# Patient Record
Sex: FEMALE | Race: BLACK OR AFRICAN AMERICAN | NOT HISPANIC OR LATINO | Employment: OTHER | URBAN - METROPOLITAN AREA
[De-identification: names, ages, dates, MRNs, and addresses within clinical notes are randomized per-mention and may not be internally consistent; named-entity substitution may affect disease eponyms.]

---

## 2017-01-23 ENCOUNTER — OFFICE VISIT (OUTPATIENT)
Dept: PULMONOLOGY | Facility: CLINIC | Age: 62
End: 2017-01-23

## 2017-01-23 VITALS
RESPIRATION RATE: 16 BRPM | OXYGEN SATURATION: 98 % | DIASTOLIC BLOOD PRESSURE: 90 MMHG | TEMPERATURE: 97.4 F | HEIGHT: 63 IN | SYSTOLIC BLOOD PRESSURE: 140 MMHG | WEIGHT: 194 LBS | HEART RATE: 86 BPM | BODY MASS INDEX: 34.38 KG/M2

## 2017-01-23 DIAGNOSIS — D86.9 SARCOIDOSIS: ICD-10-CM

## 2017-01-23 DIAGNOSIS — J30.89 PERENNIAL ALLERGIC RHINITIS, UNSPECIFIED ALLERGIC RHINITIS TRIGGER: ICD-10-CM

## 2017-01-23 DIAGNOSIS — J45.20 MILD INTERMITTENT ASTHMA WITHOUT COMPLICATION: Primary | ICD-10-CM

## 2017-01-23 PROCEDURE — 99214 OFFICE O/P EST MOD 30 MIN: CPT | Performed by: NURSE PRACTITIONER

## 2017-01-23 NOTE — MR AVS SNAPSHOT
Kathy Asher   1/23/2017 12:30 PM   Office Visit    Dept Phone:  [phone]   Encounter #:  [id number]    Provider:  SYLVESTER Noel   Department:  [facility]                Your Full Care Plan              Today's Medication Changes          These changes are accurate as of: 1/23/17  1:43 PM.  If you have any questions, ask your nurse or doctor.               Stop taking medication(s)listed here:     Okmopxyfx-Eymmjtoe-RS 30-3-15 MG/5ML syrup                      Your Updated Medication List          This list is accurate as of: 1/23/17  1:43 PM.  Always use your most recent med list.                ADVANCED CALCIUM/D/MAGNESIUM tablet       albuterol 108 (90 BASE) MCG/ACT inhaler   Commonly known as:  PROAIR HFA   Inhale 2 puffs every 4 (four) hours as needed for wheezing.       ASTEPRO 0.15 % solution nasal spray   Generic drug:  azelastine       cetirizine 10 MG tablet   Commonly known as:  zyrTEC       FLONASE 50 MCG/ACT nasal spray   Generic drug:  fluticasone       latanoprost 0.005 % ophthalmic solution   Commonly known as:  XALATAN       MULTI-VITAMIN tablet       Vitamin C 500 MG capsule       Vitamin E 400 UNITS tablet       ZYRTEC-D ALLERGY & CONGESTION 5-120 MG per 12 hr tablet   Generic drug:  cetirizine-pseudoephedrine               You Were Diagnosed With        Codes Comments    Mild intermittent asthma without complication    -  Primary ICD-10-CM: J45.20  ICD-9-CM: 493.90     Sarcoidosis     ICD-10-CM: D86.9  ICD-9-CM: 135     Perennial allergic rhinitis, unspecified allergic rhinitis trigger     ICD-10-CM: J30.89  ICD-9-CM: 477.8       Instructions     None    Patient Instructions History      Upcoming Appointments     Visit Type Date Time Department    FOLLOW UP 1/23/2017 12:30 PM [facility]    FOLLOW UP 1 UNIT 1/31/2017 11:00 AM [facility]    FOLLOW UP 3/23/2017 11:00 AM [facility]    FOLLOW UP 2  "UNIT 4/20/2017 10:30 AM [facility]      MyChart Signup     Our records indicate that you have declined [facility] MyChart signup. If you would like to sign up for MyChart, please email [email or web address] or call [phone] to obtain an activation code.             Other Info from Your Visit           Your Appointments     Jan 31, 2017 11:00 AM EST   FOLLOW UP with SYLVESTER Avila   [facility] GYN[facility]OLOG[facility] ONCOL[facility] (--)    [address] [place] [id number] [phone]            Mar 23, 2017 11:00 AM EDT   Follow Up with SYLVESTER Noel   [facility] (--)    [address]  [facility] [address] [phone]           Arrive 15 minutes prior to appointment.            Apr 20, 2017 10:30 AM EDT   FOLLOW UP with SYLVESTER Avila   [facility] GYNECOLOGIC ONCOLOGY (--)    [address] [address] [phone]              Allergies     Ciprofloxacin      Etodolac        Reason for Visit     Breathing Problem           Vital Signs     Blood Pressure Pulse Temperature Respirations Height Weight    140/90 86 97.4 °F (36.3 °C) 16 63\" (160 cm) 194 lb (88 kg)    Oxygen Saturation Body Mass Index Smoking Status             98% 34.37 kg/m2 Never Smoker         Problems and Diagnoses Noted     Nasal inflammation due to allergen    Asthma    Sarcoidosis        "

## 2017-01-23 NOTE — PROGRESS NOTES
"[facility] Pulmonary Follow up    CHIEF COMPLAINT    Asthma, chronic cough    HISTORY OF PRESENT ILLNESS    Kathy Asher is a 61 y.o.female here today for routine follow-up.  She is seen for chronic cough with asthma and allergic rhinitis.  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    She's had a couple episodes of bronchitis over the fall currently she is doing much better.  She has very little cough or sputum production.  She has no shortness of breath.  She denies any fevers or chills.  She's been using her Flonase as well as her Zyrtec-D and is having very adequate control of her allergic rhinitis.    She occasionally uses her albuterol for some wheezing but not daily.  Her last PFTs in August were normal with an FEV1 of 112%, total lung capacity of 90.    Unfortunately she's having some issues with some air quality trouble in her home, she has a \"drug house\" next to her that's causing some problems.  She is seeking advice of the health department for air quality, as well as the Police Department in her neighborhood association.  She does have some worsening irritation and cough for she's in her home.    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)   • Abnormal finding on lung imaging   • Asthma   • IBS (irritable bowel syndrome)   • Murmur   • Raised level of immunoglobulins   • Sarcoidosis   • Allergic rhinitis       Allergies   Allergen Reactions   • Ciprofloxacin    • Etodolac        Current Outpatient Prescriptions:   •  albuterol (PROAIR HFA) 108 (90 BASE) MCG/ACT inhaler, Inhale 2 puffs every 4 (four) hours as needed for wheezing., Disp: 1 inhaler, Rfl: 11  •  Ascorbic Acid (VITAMIN C) 500 MG capsule, Take  by mouth., Disp: , Rfl:   •  azelastine (ASTEPRO) 0.15 % solution " "nasal spray, into each nostril., Disp: , Rfl:   •  cetirizine (ZyrTEC) 10 MG tablet, Take  by mouth., Disp: , Rfl:   •  cetirizine-pseudoephedrine (ZYRTEC-D ALLERGY & CONGESTION) 5-120 MG per 12 hr tablet, Take  by mouth., Disp: , Rfl:   •  fluticasone (FLONASE) 50 MCG/ACT nasal spray, into each nostril 2 (two) times a day., Disp: , Rfl:   •  latanoprost (XALATAN) 0.005 % ophthalmic solution, Apply  to eye., Disp: , Rfl:   •  Multiple Minerals-Vitamins (ADVANCED CALCIUM/D/MAGNESIUM) tablet, Take  by mouth., Disp: , Rfl:   •  Multiple Vitamin (MULTI-VITAMIN) tablet, Take  by mouth., Disp: , Rfl:   •  Vitamin E 400 UNITS tablet, Take  by mouth., Disp: , Rfl:   MEDICATION LIST AND ALLERGIES REVIEWED.    Social History   Substance Use Topics   • Smoking status: Never Smoker   • Smokeless tobacco: Not on file   • Alcohol use No       FAMILY AND SOCIAL HISTORY REVIEWED.    Review of Systems   Constitutional: Negative for chills, fatigue, fever and unexpected weight change.   HENT: Positive for postnasal drip. Negative for congestion, nosebleeds, rhinorrhea, sinus pressure and trouble swallowing.    Respiratory: Positive for cough. Negative for chest tightness, shortness of breath and wheezing.    Cardiovascular: Negative for chest pain and leg swelling.   Gastrointestinal: Negative for abdominal pain, constipation, diarrhea, nausea and vomiting.   Genitourinary: Negative for dysuria, frequency, hematuria and urgency.   Musculoskeletal: Negative for myalgias.   Neurological: Negative for dizziness, weakness, numbness and headaches.   All other systems reviewed and are negative.  .    Visit Vitals   • /90   • Pulse 86   • Temp 97.4 °F (36.3 °C)   • Resp 16   • Ht 63\" (160 cm)   • Wt 194 lb (88 kg)   • SpO2 98%  Comment: RA   • BMI 34.37 kg/m2     Physical Exam   Constitutional: She is oriented to person, place, and time. She appears well-developed and well-nourished.   HENT:   Head: Normocephalic and atraumatic. "   Eyes: EOM are normal. Pupils are equal, round, and reactive to light.   Neck: Normal range of motion. Neck supple.   Cardiovascular: Normal rate and regular rhythm.    No murmur heard.  Pulmonary/Chest: Effort normal and breath sounds normal. No respiratory distress. She has no wheezes. She has no rales.   Abdominal: Soft. Bowel sounds are normal. She exhibits no distension.   Musculoskeletal: Normal range of motion. She exhibits no edema.   Neurological: She is alert and oriented to person, place, and time.   Skin: Skin is warm and dry. No erythema.   Psychiatric: She has a normal mood and affect. Her behavior is normal.   Vitals reviewed.      RESULTS        PROBLEM LIST    Problem List Items Addressed This Visit        Respiratory    Asthma - Primary    Allergic rhinitis       Other    Sarcoidosis    Overview     Diagnosed by lymph node biopsy in 2000.  She has well preserved spirometry.                     DISCUSSION    We discussed the possibility of adding a better filter on her home to see if this would help with the air circulation.  Continue with her current regimen    Follow-up in 6 months or as needed.    Maggi Valdes, SYLVESTER  01/23/201712:44 PM  Electronically signed     Please note that portions of this note were completed with a voice recognition program. Efforts were made to edit the dictations, but occasionally words are mistranscribed.      CC: Gilberto York MD

## 2017-01-31 ENCOUNTER — OFFICE VISIT (OUTPATIENT)
Dept: GYNECOLOGIC ONCOLOGY | Facility: CLINIC | Age: 62
End: 2017-01-31

## 2017-01-31 VITALS
WEIGHT: 192 LBS | DIASTOLIC BLOOD PRESSURE: 79 MMHG | OXYGEN SATURATION: 99 % | TEMPERATURE: 96.8 F | BODY MASS INDEX: 34.01 KG/M2 | RESPIRATION RATE: 16 BRPM | SYSTOLIC BLOOD PRESSURE: 143 MMHG | HEART RATE: 90 BPM

## 2017-01-31 DIAGNOSIS — T21.45XD: ICD-10-CM

## 2017-01-31 DIAGNOSIS — D07.2 VAIN III (VAGINAL INTRAEPITHELIAL NEOPLASIA GRADE III): Primary | ICD-10-CM

## 2017-01-31 PROCEDURE — 99213 OFFICE O/P EST LOW 20 MIN: CPT | Performed by: NURSE PRACTITIONER

## 2017-01-31 NOTE — MR AVS SNAPSHOT
Kathy Asher   1/31/2017 11:00 AM   Office Visit    Dept Phone:  [phone]   Encounter #:  [id number]    Provider:  SYLVESTER Avila   Department:  [facility]                Your Full Care Plan              Your Updated Medication List          This list is accurate as of: 1/31/17 11:54 AM.  Always use your most recent med list.                ADVANCED CALCIUM/D/MAGNESIUM tablet       albuterol 108 (90 BASE) MCG/ACT inhaler   Commonly known as:  PROAIR HFA   Inhale 2 puffs every 4 (four) hours as needed for wheezing.       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 If you would like to sign up for [employer], please email [email or web address] or call [phone] to obtain an activation code.             Other Info from Your Visit           Your Appointments     Mar  23, 2017 11:00 AM EDT   Follow Up with SYLVESTER Noel   [facility] (--)    [address] [facility]  [facility] [address] [phone]           Arrive 15 minutes prior to appointment.            Jul 27, 2017  2:30 PM EDT   FOLLOW UP with SYLVESTER Avila   [facility] GYNECOLOGIC ONCOLOGY (--)    [address] [facility] [address]  [facility] [address] [phone]              Allergies     Ciprofloxacin      Etodolac        Reason for Visit     Follow-up PRESSURE AND LQ PAIN.       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

## 2017-01-31 NOTE — PROGRESS NOTES
GYN ONCOLOGY FOLLOW-UP    Kathy Asher  [id number]  1955    Chief Complaint: Follow-up (PRESSURE AND LQ PAIN. )        History of present illness:  Kathy Asher is a 61 y.o. year old female who is here today for ongoing surveillance of vaginal dysplasia with repeat pap smear. She is s/p upper vaginectomy on 6/27/2016. Her post-op course was complicated by a gluteal fold lesion requiring wound care thought to be related to reaction to surgical prep. Today she reports she is feeling generally well except for some occasional pelvic pressure that is intermittent. She also reports some concern regarding the scarring to her buttock. The area has healed well, but she has a large dark scar that has remained despite applying OTC scar treatment lotions. She also has some occasional nerve pain that starts in the buttock and radiates outward on occasion. She informs me she has spoken with the financial team at [facility] during her wound care phase. She does report she is somewhat frustrated, however, as she tried to contact the Risk  she had been working with in 8/2016 and never received a return phone call.   She denies vaginal bleeding, pelvic pain, visible genital lesions, or changes in bowel or bladder function.       Past Medical History   Diagnosis Date   • Abnormal finding on lung imaging      CT scan of the chest 06/04/2014 reports interval improvement in the right perihilar masslike opacity and stability in the left perihilar opacity, pulmonary nodules and lymphadenopathy.   • Abnormal Pap smear of cervix    • Allergic rhinitis    • Asthma    • Cervical dysplasia      CIN2   • History of chest x-ray 08/27/2015     interval increase in patchy opacity within the right upper lobe compared to prior study; previously described left mid lung opacity appears similar to the prior study; dextrocurvature of thoracic spine with mild degenerative changes of the spine    • History of chest x-ray  07/09/2012     extensive, masslike and infiltrating disease of the right upper lobe and mild left perihilar disease   • History of chest x-ray 11/17/2010     chest is hyperinflated; infiltrate in right upper lobe apical segment with some associated adenopathy and old granulomatous changes    • History of histoplasmosis    • History of PFTs 06/06/2014     data is acceptable and reproducible; pt given 3 puffs of albuterol; pt gave good effort    • History of PFTs 08/12/2013     data is acceptable and reproducible; pt given 3 puffs of albuterol; pt gave good effort    • History of PFTs 01/24/2011     pt gave good effort; spirometry is acceptable and reproducible;    • IBS (irritable bowel syndrome)    • Sarcoidosis      Diagnosed by lymph node biopsy in 2000.  She has well preserved spirometry.   • Severe vaginal dysplasia 05/02/2016     VAIN3   • Vulvar dysplasia      VIN3       Past Surgical History   Procedure Laterality Date   • Breast biopsy     • Lymph node biopsy     • Colonoscopy     • Diagnostic laparoscopy exploratory laparotomy     • Mouth surgery     • Excision lesion  06/27/2016     vaginal WLE       MEDICATIONS: The current medication list was reviewed and reconciled.     Allergies:  is allergic to ciprofloxacin and etodolac.    Family History   Problem Relation Age of Onset   • Hyperlipidemia Mother    • Hypertension Mother    • Arthritis Mother    • Heart disease Father    • Diabetes Sister    • Hyperlipidemia Sister    • Diabetes Maternal Grandmother      Review of Systems   Constitutional: Negative for appetite change, chills, fatigue, fever and unexpected weight change.   Respiratory: Negative for cough, shortness of breath and wheezing.    Cardiovascular: Negative for chest pain, palpitations and leg swelling.   Gastrointestinal: Negative for abdominal distention, abdominal pain, blood in stool, constipation, diarrhea, nausea and vomiting.   Endocrine: Negative.    Genitourinary: Positive for pelvic  "pain (\"pressure\"). Negative for dyspareunia, dysuria, frequency, genital sores, hematuria, urgency, vaginal bleeding, vaginal discharge and vaginal pain.   Musculoskeletal: Positive for back pain (\"shooting nerve pain\" near buttock lesion). Negative for arthralgias, gait problem and joint swelling.   Neurological: Negative for dizziness, seizures, syncope, weakness, light-headedness, numbness and headaches.   Hematological: Negative for adenopathy.   Psychiatric/Behavioral: Negative.        Physical Exam  Vital Signs: Visit Vitals   • /79   • Pulse 90   • Temp 96.8 °F (36 °C) (Temporal Artery )   • Resp 16   • Wt 192 lb (87.1 kg)   • SpO2 99%   • BMI 34.01 kg/m2      General Appearance:  alert, cooperative, no apparent distress and appears stated age   Neurologic/Psychiatric: A&O x 3, gait steady, appropriate affect   HEENT:  Normocephalic, without obvious abnormality, mucous membranes moist   Abdomen:   Soft, non-tender, non-distended and no organomegaly   Lymph nodes: No cervical, supraclavicular, inguinal or axillary adenopathy noted   Extremities: Normal, atraumatic; no clubbing, cyanosis, or edema    Pelvic: External Genitalia  without lesions or skin changes  Vagina  is pink, moist, without lesions.   Vaginal Cuff  Female Vaginal Cuff: smooth, intact, without visible lesions, pap obtained and well healed  Uterus  surgically absent  Ovaries  surgically absent bilaterallly  Parametria  smooth  Rectovaginal  Female rectovaginal: deferred       Buttock was examined and there is residual cutaneous pigmentation from previous gluteal fold lesion                                                               bilaterally. There is no skin disruption noted.        Procedure Notes:  No notes on file    Assessment and Plan:    Kathy was seen today for follow-up.    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 She may call in 1 week for today's results. She is in agreement with this plan.   I discussed with her I am pleased to see the buttock area has healed well, despite the darkened pigmentation. We discussed that wounds can have residual nerve effects for some time and this is likely the \"shooting\" sensations she occasionally feels. I allowed her to speak about her frustrations and informed her I will follow-up with Risk Management on her behalf. I will request that they follow-up with her. I encouraged her to call me at the clinic if she is not contacted within the next few weeks. She v/u.     Return in about 6 months (around 7/31/2017) for repeat pap smear.      Lina Butcher, SYLVESTER      Note: Speech recognition transcription software was used to dictate portions of this document.  An attempt at proofreading has been made though minor errors in transcription may still be present.  Please do not hesitate to call our office with any questions.  "

## 2017-06-29 ENCOUNTER — TRANSCRIBE ORDERS (OUTPATIENT)
Dept: FAMILY MEDICINE CLINIC | Facility: CLINIC | Age: 62
End: 2017-06-29

## 2017-06-29 ENCOUNTER — HOSPITAL ENCOUNTER (OUTPATIENT)
Dept: GENERAL RADIOLOGY | Facility: HOSPITAL | Age: 62
Discharge: HOME | End: 2017-06-29

## 2017-06-29 ENCOUNTER — LAB (OUTPATIENT)
Dept: LAB | Facility: HOSPITAL | Age: 62
End: 2017-06-29

## 2017-06-29 ENCOUNTER — TRANSCRIBE ORDERS (OUTPATIENT)
Dept: LAB | Facility: HOSPITAL | Age: 62
End: 2017-06-29

## 2017-06-29 ENCOUNTER — TRANSCRIBE ORDERS (OUTPATIENT)
Dept: ADMINISTRATIVE | Facility: HOSPITAL | Age: 62
End: 2017-06-29

## 2017-06-29 ENCOUNTER — HOSPITAL ENCOUNTER (OUTPATIENT)
Dept: GENERAL RADIOLOGY | Facility: HOSPITAL | Age: 62
Discharge: HOME | End: 2017-06-29
Admitting: NURSE PRACTITIONER

## 2017-06-29 DIAGNOSIS — M25.511 RIGHT SHOULDER PAIN, UNSPECIFIED CHRONICITY: Primary | ICD-10-CM

## 2017-06-29 DIAGNOSIS — I10 ESSENTIAL HYPERTENSION, MALIGNANT: ICD-10-CM

## 2017-06-29 DIAGNOSIS — R00.2 PALPITATIONS: ICD-10-CM

## 2017-06-29 DIAGNOSIS — R53.83 FATIGUE, UNSPECIFIED TYPE: ICD-10-CM

## 2017-06-29 DIAGNOSIS — I10 ESSENTIAL HYPERTENSION, MALIGNANT: Primary | ICD-10-CM

## 2017-06-29 DIAGNOSIS — R00.2 PALPITATIONS: Primary | ICD-10-CM

## 2017-06-29 DIAGNOSIS — M25.511 RIGHT SHOULDER PAIN, UNSPECIFIED CHRONICITY: ICD-10-CM

## 2017-06-29 DIAGNOSIS — Z51.81 ENCOUNTER FOR THERAPEUTIC DRUG MONITORING: ICD-10-CM

## 2017-06-29 LAB
ALBUMIN SERPL-MCNC: 4.3 G/DL (ref 3.2–4.8)
ALBUMIN/GLOB SERPL: 1.3 G/DL (ref 1.5–2.5)
ALP SERPL-CCNC: 58 U/L (ref 25–100)
ALT SERPL W P-5'-P-CCNC: 20 U/L (ref 7–40)
ANION GAP SERPL CALCULATED.3IONS-SCNC: 9 MMOL/L (ref 3–11)
AST SERPL-CCNC: 22 U/L (ref 0–33)
BILIRUB SERPL-MCNC: 0.5 MG/DL (ref 0.3–1.2)
BUN BLD-MCNC: 15 MG/DL (ref 9–23)
BUN/CREAT SERPL: 16.7 (ref 7–25)
CALCIUM SPEC-SCNC: 10.3 MG/DL (ref 8.7–10.4)
CHLORIDE SERPL-SCNC: 101 MMOL/L (ref 99–109)
CO2 SERPL-SCNC: 29 MMOL/L (ref 20–31)
CREAT BLD-MCNC: 0.9 MG/DL (ref 0.6–1.3)
DEPRECATED RDW RBC AUTO: 40.1 FL (ref 37–54)
ERYTHROCYTE [DISTWIDTH] IN BLOOD BY AUTOMATED COUNT: 15.2 % (ref 11.3–14.5)
GFR SERPL CREATININE-BSD FRML MDRD: 77 ML/MIN/1.73
GLOBULIN UR ELPH-MCNC: 3.4 GM/DL
GLUCOSE BLD-MCNC: 81 MG/DL (ref 70–100)
HCT VFR BLD AUTO: 46.1 % (ref 34.5–44)
HGB BLD-MCNC: 14.5 G/DL (ref 11.5–15.5)
MCH RBC QN AUTO: 22.9 PG (ref 27–31)
MCHC RBC AUTO-ENTMCNC: 31.5 G/DL (ref 32–36)
MCV RBC AUTO: 72.9 FL (ref 80–99)
PLATELET # BLD AUTO: 220 10*3/MM3 (ref 150–450)
PMV BLD AUTO: 11.4 FL (ref 6–12)
POTASSIUM BLD-SCNC: 4.1 MMOL/L (ref 3.5–5.5)
PROT SERPL-MCNC: 7.7 G/DL (ref 5.7–8.2)
RBC # BLD AUTO: 6.32 10*6/MM3 (ref 3.89–5.14)
SODIUM BLD-SCNC: 139 MMOL/L (ref 132–146)
TSH SERPL DL<=0.05 MIU/L-ACNC: 2.08 MIU/ML (ref 0.35–5.35)
WBC NRBC COR # BLD: 5.98 10*3/MM3 (ref 3.5–10.8)

## 2017-06-29 PROCEDURE — 71020 HC CHEST PA AND LATERAL: CPT

## 2017-06-29 PROCEDURE — 84443 ASSAY THYROID STIM HORMONE: CPT | Performed by: NURSE PRACTITIONER

## 2017-06-29 PROCEDURE — 36415 COLL VENOUS BLD VENIPUNCTURE: CPT

## 2017-06-29 PROCEDURE — 85027 COMPLETE CBC AUTOMATED: CPT | Performed by: NURSE PRACTITIONER

## 2017-06-29 PROCEDURE — 73030 X-RAY EXAM OF SHOULDER: CPT

## 2017-06-29 PROCEDURE — 80053 COMPREHEN METABOLIC PANEL: CPT | Performed by: NURSE PRACTITIONER

## 2017-07-27 ENCOUNTER — OFFICE VISIT (OUTPATIENT)
Dept: GYNECOLOGIC ONCOLOGY | Facility: CLINIC | Age: 62
End: 2017-07-27

## 2017-07-27 VITALS
WEIGHT: 190.8 LBS | TEMPERATURE: 97.8 F | SYSTOLIC BLOOD PRESSURE: 148 MMHG | HEIGHT: 63 IN | DIASTOLIC BLOOD PRESSURE: 78 MMHG | OXYGEN SATURATION: 95 % | HEART RATE: 92 BPM | BODY MASS INDEX: 33.81 KG/M2 | RESPIRATION RATE: 16 BRPM

## 2017-07-27 DIAGNOSIS — N87.1 CIN II (CERVICAL INTRAEPITHELIAL NEOPLASIA II): ICD-10-CM

## 2017-07-27 DIAGNOSIS — Z01.419 WELL WOMAN EXAM WITH ROUTINE GYNECOLOGICAL EXAM: Primary | ICD-10-CM

## 2017-07-27 DIAGNOSIS — D07.2 VAIN III (VAGINAL INTRAEPITHELIAL NEOPLASIA GRADE III): ICD-10-CM

## 2017-07-27 DIAGNOSIS — D07.1 VIN III (VULVAR INTRAEPITHELIAL NEOPLASIA III): ICD-10-CM

## 2017-07-27 PROCEDURE — 99396 PREV VISIT EST AGE 40-64: CPT | Performed by: NURSE PRACTITIONER

## 2017-07-27 RX ORDER — AZELASTINE 1 MG/ML
SPRAY, METERED NASAL
Refills: 11 | COMMUNITY
Start: 2017-05-18

## 2017-07-27 RX ORDER — MONTELUKAST SODIUM 10 MG/1
TABLET ORAL
Refills: 11 | COMMUNITY
Start: 2017-07-24

## 2017-07-27 NOTE — PROGRESS NOTES
GYN ONCOLOGY ANNUAL WELL WOMAN VISIT      Kathy Asher  [id number]  1955      Chief Complaint: Annual Exam (pap and pelvic)        History of present illness:  Kathy Asher is a 61 y.o. year old female who is here today for an annual exam and ongoing surveillance for VAIN III, s/p upper vaginectomy on 2017. Last pap smear was LSIL on 2017. She also has a personal history of ELA III and ANNABELLA II, see history below. She denies vaginal bleeding, pelvic pain, visible genital lesions, breast concerns, or changes in bowel or bladder function.   Her well woman screenings are all currently UTD, she states these are followed by her PCP office. She is due for her annual physical with Dr. York next week. She declines CBE today, stating she recently had a repeat mammogram for an abnormal cyst and results were good. This report is in her EMR, BiRADS II on 2017, completed at [facility].       Dysplasia history:  : LEEP with Dr. Gupta. Recurrent LSIL pap smears every 6 months after that time  2014: Biopsy of posterior vulvar lesion, path revealed ELA III. Referred to Dr. Harrell for second opinion  10/2/2014: Colpo with biopsies, revealed mild squamous dysplasia with HPV effect  12/10/2014: LAVH/BSO with vulvar WLE. Pathology consistent with ELA III, left margin focally positive. Cervix showed extensive endocervical moderate squamous epithelial dysplasia with HPV effect (ANNABELLA II).   2016: Pap smear of vaginal cuff showed LSIL with HPV non 16/18+  2016: Vaginoscopy with biopsy, pathology revealed severe dysplasia with HPV effect  2016: WLE of vaginal dysplasia, pathology consistent with ELA III, margins negative for dysplasia but positive for HPV effect  2017: Pap smear of vaginal cuff showed LSIL, stable considering history    Obstetric History:  OB History      Para Term  AB TAB SAB Ectopic Multiple Living    0 0 0 0 0 0 0 0 0 0         Menstrual  History:     No LMP recorded. Patient is postmenopausal.          Past Medical History:   Diagnosis Date   • Abnormal finding on lung imaging     CT scan of the chest 06/04/2014 reports interval improvement in the right perihilar masslike opacity and stability in the left perihilar opacity, pulmonary nodules and lymphadenopathy.   • Abnormal Pap smear of cervix    • Allergic rhinitis    • Asthma    • Cervical dysplasia     CIN2   • History of chest x-ray 08/27/2015    interval increase in patchy opacity within the right upper lobe compared to prior study; previously described left mid lung opacity appears similar to the prior study; dextrocurvature of thoracic spine with mild degenerative changes of the spine    • History of chest x-ray 07/09/2012    extensive, masslike and infiltrating disease of the right upper lobe and mild left perihilar disease   • History of chest x-ray 11/17/2010    chest is hyperinflated; infiltrate in right upper lobe apical segment with some associated adenopathy and old granulomatous changes    • History of histoplasmosis    • History of PFTs 06/06/2014    data is acceptable and reproducible; pt given 3 puffs of albuterol; pt gave good effort    • History of PFTs 08/12/2013    data is acceptable and reproducible; pt given 3 puffs of albuterol; pt gave good effort    • History of PFTs 01/24/2011    pt gave good effort; spirometry is acceptable and reproducible;    • IBS (irritable bowel syndrome)    • Sarcoidosis     Diagnosed by lymph node biopsy in 2000.  She has well preserved spirometry.   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   • Vulvar dysplasia     VIN3       Past Surgical History:   Procedure Laterality Date   • BREAST BIOPSY     • COLONOSCOPY     • DIAGNOSTIC LAPAROSCOPY EXPLORATORY LAPAROTOMY     • EXCISION LESION  06/27/2016    vaginal WLE   • LYMPH NODE BIOPSY     • MOUTH SURGERY         MEDICATIONS: The current medication list was reviewed and reconciled.      Allergies:  is allergic to ciprofloxacin and etodolac.    Family History   Problem Relation Age of Onset   • Hyperlipidemia Mother    • Hypertension Mother    • Arthritis Mother    • Heart disease Father    • Diabetes Sister    • Hyperlipidemia Sister    • Diabetes Maternal Grandmother        Health Maintenance:  Last mammogram was 7/24/2017. Last colonoscopy was 2015 or 2016, with recommended follow-up in 3 year(s). Last DEXA was 2015. Last pap smear was 1/31/2017, results were  LGSIL - mild dysplasia.. She  does have a history of abnormal pap smears, see dysplasia history above.      Review of Systems   Constitutional: Negative for fatigue, fever and unexpected weight change.   HENT: Negative for congestion, ear pain, hearing loss, sinus pressure and trouble swallowing.    Eyes: Negative for visual disturbance.   Respiratory: Negative for cough, chest tightness, shortness of breath and wheezing.    Cardiovascular: Negative for chest pain, palpitations and leg swelling.   Gastrointestinal: Negative for abdominal distention, abdominal pain, constipation, diarrhea, nausea and vomiting.   Endocrine: Negative for cold intolerance, heat intolerance, polydipsia, polyphagia and polyuria.   Genitourinary: Negative for difficulty urinating, dyspareunia, dysuria, frequency, hematuria, pelvic pain, urgency, vaginal bleeding, vaginal discharge and vaginal pain.   Musculoskeletal: Negative for arthralgias, gait problem, joint swelling and myalgias.   Skin: Negative for color change, pallor and rash.   Neurological: Negative for dizziness, seizures, syncope, weakness, light-headedness, numbness and headaches.   Hematological: Negative for adenopathy. Does not bruise/bleed easily.   Psychiatric/Behavioral: Negative for agitation, confusion, sleep disturbance and suicidal ideas. The patient is not nervous/anxious.        Physical Exam  Vital Signs: /78  Pulse 92  Temp 97.8 °F (36.6 °C) (Temporal Artery )   Resp 16   "Ht 63\" (160 cm)  Wt 190 lb 12.8 oz (86.5 kg)  SpO2 95%  BMI 33.8 kg/m2   General Appearance:  alert, cooperative, no apparent distress and appears stated age   Neurologic/Psychiatric: A&O x 3, gait steady, appropriate affect   HEENT:  Normocephalic, without obvious abnormality, mucous membranes moist   Neck: Supple, symmetrical, trachea midline, no adenopathy;  No thyromegaly, masses, or tenderness   Back:   Symmetric, no curvature, ROM normal, no CVA tenderness. Residual skin pigmentation change at low back/gluteal cleft from previous bilateral gluteal fold lesion, findings consistent with previous exams   Lungs:   Clear to auscultation bilaterally; respirations regular, even, and unlabored bilaterally   Heart:  Regular rate and rhythm, no murmurs appreciated   Breasts:  deferred   Abdomen:   Soft, non-tender, non-distended and no organomegaly   Lymph nodes: No cervical, supraclavicular, inguinal or axillary adenopathy noted   Extremities: Normal, atraumatic; no clubbing, cyanosis, or edema    Skin: No rashes, ulcers, or suspicious lesions noted   Pelvic: External Genitalia  without lesions or skin changes  Vagina  is pink, moist, without lesions.   Vaginal Cuff  Female Vaginal Cuff: smooth, intact, without visible lesions and pap obtained  Uterus  surgically absent  Ovaries  surgically absent bilaterallly  Parametria  smooth  Rectovaginal  Female rectovaginal: deferred         Procedure Note:  No notes on file    Assessment and Plan:    Kathy was seen today for annual exam.    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 We will continue [id number] pap smears for now, unless today's pap returns worse than LSIL. She v/u.     She was encouraged to continue yearly " mammograms and repeat colonoscopy in 2018 or 2019 as instructed by colorectal surgeon. Repeat DEXA per PCP recommendations.     There is no evidence of disease or new lesions upon today's exam. She is understanding to call with any changes in pelvic symptoms or general GYN concerns.       Return in about 6 months (around 1/27/2018) for repeat pap smear.      SYLVESTER Avila      Note: Speech recognition transcription software was used to dictate portions of this document.  An attempt at proofreading has been made though minor errors in transcription may still be present.  Please do not hesitate to call our office with any questions.

## 2017-08-03 ENCOUNTER — OFFICE VISIT (OUTPATIENT)
Dept: FAMILY MEDICINE CLINIC | Facility: CLINIC | Age: 62
End: 2017-08-03

## 2017-08-03 VITALS
HEART RATE: 90 BPM | DIASTOLIC BLOOD PRESSURE: 88 MMHG | TEMPERATURE: 97.3 F | BODY MASS INDEX: 33.42 KG/M2 | OXYGEN SATURATION: 98 % | SYSTOLIC BLOOD PRESSURE: 122 MMHG | HEIGHT: 63 IN | WEIGHT: 188.6 LBS

## 2017-08-03 DIAGNOSIS — Z00.00 WELLNESS EXAMINATION: Primary | ICD-10-CM

## 2017-08-03 DIAGNOSIS — M25.511 ACUTE PAIN OF RIGHT SHOULDER: ICD-10-CM

## 2017-08-03 DIAGNOSIS — M20.41 HAMMER TOE OF RIGHT FOOT: ICD-10-CM

## 2017-08-03 PROCEDURE — 90471 IMMUNIZATION ADMIN: CPT | Performed by: NURSE PRACTITIONER

## 2017-08-03 PROCEDURE — 90715 TDAP VACCINE 7 YRS/> IM: CPT | Performed by: NURSE PRACTITIONER

## 2017-08-03 PROCEDURE — 99396 PREV VISIT EST AGE 40-64: CPT | Performed by: NURSE PRACTITIONER

## 2017-08-03 NOTE — PATIENT INSTRUCTIONS
Shoulder Range of Motion Exercises  Shoulder range of motion (ROM) exercises are designed to keep the shoulder moving freely. They are often recommended for people who have shoulder pain.  MOVEMENT EXERCISE  When you are able, do this exercise 5-6 days per week, or as told by your health care provider. Work toward doing 2 sets of 10 swings.  Pendulum Exercise  How To Do This Exercise Lying Down  1. Lie face-down on a bed with your abdomen close to the side of the bed.  2. Let your arm hang over the side of the bed.  3. Relax your shoulder, arm, and hand.  4. Slowly and gently swing your arm forward and back. Do not use your neck muscles to swing your arm. They should be relaxed. If you are struggling to swing your arm, have someone gently swing it for you. When you do this exercise for the first time, swing your arm at a 15 degree angle for 15 seconds, or swing your arm 10 times. As pain lessens over time, increase the angle of the swing to 30-45 degrees.  5. Repeat steps 1-4 with the other arm.  How To Do This Exercise While Standing  1. Stand next to a sturdy chair or table and hold on to it with your hand.  ¨ Bend forward at the waist.  ¨ Bend your knees slightly.  ¨ Relax your other arm and let it hang limp.  ¨ Relax the shoulder blade of the arm that is hanging and let it drop.  ¨ While keeping your shoulder relaxed, use body motion to swing your arm in small circles. The first time you do this exercise, swing your arm for about 30 seconds or 10 times. When you do it next time, swing your arm for a little longer.  ¨ Stand up tall and relax.  ¨ Repeat steps 1-7, this time changing the direction of the circles.  2. Repeat steps 1-8 with the other arm.  STRETCHING EXERCISES  Do these exercises 3-4 times per day on 5-6 days per week or as told by your health care provider. Work toward holding the stretch for 20 seconds.  Stretching Exercise 1  1. Lift your arm straight out in front of you.  2. Bend your arm 90  "degrees at the elbow (right angle) so your forearm goes across your body and looks like the letter \"L.\"  3. Use your other arm to gently pull the elbow forward and across your body.  4. Repeat steps 1-3 with the other arm.  Stretching Exercise 2  You will need a towel or rope for this exercise.  1. Bend one arm behind your back with the palm facing outward.  2. Hold a towel with your other hand.  3. Reach the arm that holds the towel above your head, and bend that arm at the elbow. Your wrist should be behind your neck.  4. Use your free hand to grab the free end of the towel.  5. With the higher hand, gently pull the towel up behind you.  6. With the lower hand, pull the towel down behind you.  7. Repeat steps 1-6 with the other arm.  STRENGTHENING EXERCISES  Do each of these exercises at four different times of day (sessions) every day or as told by your health care provider. To begin with, repeat each exercise 5 times (repetitions). Work toward doing 3 sets of 12 repetitions or as told by your health care provider.  Strengthening Exercise 1  You will need a light weight for this activity. As you grow stronger, you may use a heavier weight.  1. Standing with a weight in your hand, lift your arm straight out to the side until it is at the same height as your shoulder.  2. Bend your arm at 90 degrees so that your fingers are pointing to the ceiling.  3. Slowly raise your hand until your arm is straight up in the air.  4. Repeat steps 1-3 with the other arm.  Strengthening Exercise 2  You will need a light weight for this activity. As you grow stronger, you may use a heavier weight.  1. Standing with a weight in your hand, gradually move your straight arm in an arc, starting at your side, then out in front of you, then straight up over your head.  2. Gradually move your other arm in an arc, starting at your side, then out in front of you, then straight up over your head.  3. Repeat steps 1-2 with the other " "arm.  Strengthening Exercise 3  You will need an elastic band for this activity. As you grow stronger, gradually increase the size of the bands or increase the number of bands that you use at one time.  1. While standing, hold an elastic band in one hand and raise that arm up in the air.  2. With your other hand, pull down the band until that hand is by your side.  3. Repeat steps 1-2 with the other arm.     This information is not intended to replace advice given to you by your health care provider. Make sure you discuss any questions you have with your health care provider.     Document Released: 09/15/2004 Document Revised: 05/03/2016 Document Reviewed: 12/14/2015  [employer] Interactive Patient Education ©2017 [employer]  \"7-4-6-Almost None!\"  Healthy Habits Start Early    EAT 5 OR MORE SERVINGS OF VEGETABLES AND FRUITS EVERY DAY.    Help Kathy get three vegetables and two fruits each day. Red, green, yellow, orange...encourage them to try all the colors so they can enjoy different flavors and get more vitamins.    How can I help Kathy do this?  ---------------------------------------------  -BE PATIENT WITH Kathy, remember it may take 10 times before they start to like new food. So, start with small bites and just keep trying.  -Serve at least one vegetable or fruit at every meal. Even try two. Remember, portions do not have to be as big as you think.  -Encourage eating fruits and vegetables instead of drinking them..it's a better way to get fiber and vitamins..so limit the amount of juice to 1/2 cup per day for children 1-6 years and one cup per day for children 7-18 years of age. Try using 1/2 part water and 1/2 part juice.    Spend less than two hours per day watching television and other screen media. Screen media includes video games, movies and computer use for entertainment.    How can I help Kathy do this?  -Turn off the TV at dinner. Dinner is the best time to hang out with your kids and just " talk, learn about their day, and tell them about your day. Your kids have a lot to learn from you and dinner is a great time to share.

## 2017-08-03 NOTE — PROGRESS NOTES
Patient Care Team:  Gilberto York MD as PCP - General     Chief complaint: Patient is in today for a physical          Patient is a 61 y.o. female who presents for her yearly physical exam.     Arm Pain    The incident occurred more than 1 week ago (3 months at least). Incident location: no known injury. There was no injury mechanism. The pain is present in the right shoulder. The quality of the pain is described as aching. The pain radiates to the right arm (upper arm). The pain is at a severity of 8/10. The pain has been fluctuating (there most of time) since the incident. Associated symptoms include chest pain (Occasional cp, accross ant chest, with occ ache in mid chest, lasts until use inhalerf and resolves.). The symptoms are aggravated by movement. She has tried NSAIDs (aleve, aspircream, Bengay, helps for short time) for the symptoms. The treatment provided moderate relief.           Review of Systems   Constitutional: Negative for appetite change, chills, fatigue and fever.   HENT: Negative for ear pain and sore throat. Postnasal drip: better.    Eyes: Positive for visual disturbance (has floater in right eye , with small cataract, seeing opthalmology). Negative for itching.   Respiratory: Positive for shortness of breath and wheezing (occasional). Negative for cough.    Cardiovascular: Positive for chest pain (Occasional cp, accross ant chest, with occ ache in mid chest, lasts until use inhalerf and resolves.). Negative for palpitations and leg swelling.   Gastrointestinal: Negative for abdominal pain, constipation, diarrhea, nausea and vomiting.   Endocrine: Negative for cold intolerance and heat intolerance.   Genitourinary: Negative for difficulty urinating, dysuria and hematuria.   Musculoskeletal: Negative for arthralgias, back pain and myalgias.   Skin: Negative for rash and wound.   Allergic/Immunologic: Negative for environmental allergies and food allergies.   Neurological: Negative for  dizziness and headaches.   Hematological: Negative for adenopathy. Does not bruise/bleed easily.   Psychiatric/Behavioral: Negative for sleep disturbance. The patient is not nervous/anxious.            History  Past Medical History:   Diagnosis Date   • Abnormal finding on lung imaging     CT scan of the chest 06/04/2014 reports interval improvement in the right perihilar masslike opacity and stability in the left perihilar opacity, pulmonary nodules and lymphadenopathy.   • Abnormal Pap smear of cervix    • Allergic rhinitis    • Asthma    • Cervical dysplasia     CIN2   • History of chest x-ray 08/27/2015    interval increase in patchy opacity within the right upper lobe compared to prior study; previously described left mid lung opacity appears similar to the prior study; dextrocurvature of thoracic spine with mild degenerative changes of the spine    • History of chest x-ray 07/09/2012    extensive, masslike and infiltrating disease of the right upper lobe and mild left perihilar disease   • History of chest x-ray 11/17/2010    chest is hyperinflated; infiltrate in right upper lobe apical segment with some associated adenopathy and old granulomatous changes    • History of histoplasmosis    • History of PFTs 06/06/2014    data is acceptable and reproducible; pt given 3 puffs of albuterol; pt gave good effort    • History of PFTs 08/12/2013    data is acceptable and reproducible; pt given 3 puffs of albuterol; pt gave good effort    • History of PFTs 01/24/2011    pt gave good effort; spirometry is acceptable and reproducible;    • IBS (irritable bowel syndrome)    • Sarcoidosis     Diagnosed by lymph node biopsy in 2000.  She has well preserved spirometry.   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   • Vulvar dysplasia     VIN3      Past Surgical History:   Procedure Laterality Date   • BREAST BIOPSY     • COLONOSCOPY     • DIAGNOSTIC LAPAROSCOPY EXPLORATORY LAPAROTOMY     • EXCISION LESION  06/27/2016     vaginal WLE   • LYMPH NODE BIOPSY     • MOUTH SURGERY        Allergies   Allergen Reactions   • Ciprofloxacin    • Etodolac       Family History   Problem Relation Age of Onset   • Hyperlipidemia Mother    • Hypertension Mother    • Arthritis Mother    • Heart disease Father    • Diabetes Sister    • Hyperlipidemia Sister    • Diabetes Maternal Grandmother      Social History     Social History   • Marital status: Single     Spouse name: N/A   • Number of children: N/A   • Years of education: N/A     Occupational History   • retired       Social History Main Topics   • Smoking status: Never Smoker   • Smokeless tobacco: Never Used   • Alcohol use No   • Drug use: No   • Sexual activity: Not on file      Comment: single     Other Topics Concern   • Not on file     Social History Narrative        Current Outpatient Prescriptions:   •  albuterol (PROAIR HFA) 108 (90 BASE) MCG/ACT inhaler, Inhale 2 puffs every 4 (four) hours as needed for wheezing., Disp: 1 inhaler, Rfl: 11  •  Ascorbic Acid (VITAMIN C) 500 MG capsule, Take  by mouth., Disp: , Rfl:   •  azelastine (ASTELIN) 0.1 % nasal spray, U 2 SPRAYS IEN BID PRF ALLERGIES, Disp: , Rfl: 11  •  azelastine (ASTEPRO) 0.15 % solution nasal spray, into each nostril., Disp: , Rfl:   •  cetirizine (ZyrTEC) 10 MG tablet, Take  by mouth., Disp: , Rfl:   •  cetirizine-pseudoephedrine (ZYRTEC-D ALLERGY & CONGESTION) 5-120 MG per 12 hr tablet, Take  by mouth., Disp: , Rfl:   •  fluticasone (FLONASE) 50 MCG/ACT nasal spray, into each nostril 2 (two) times a day., Disp: , Rfl:   •  latanoprost (XALATAN) 0.005 % ophthalmic solution, Apply  to eye., Disp: , Rfl:   •  montelukast (SINGULAIR) 10 MG tablet, TK 1 T PO IN THE DEREK, Disp: , Rfl: 11  •  Multiple Minerals-Vitamins (ADVANCED CALCIUM/D/MAGNESIUM) tablet, Take  by mouth., Disp: , Rfl:   •  Multiple Vitamin (MULTI-VITAMIN) tablet, Take  by mouth., Disp: , Rfl:   •  Vitamin E 400 UNITS tablet, Take  by mouth., Disp: , Rfl:   •   zoster vaccine live (ZOSTAVAX) 67759 UNT/0.65ML reconstituted suspension, Inject 1 dose under the skin 1 (One) Time for 1 dose., Disp: 1 each, Rfl: 0      Immunizations   N/A   Prescribed/Refused   Date     Td/Tdap  (Booster Q 10 yrs)   []           Prescribed    []     Refused        []      needs     Flu  (Yearly)   [x]        Prescribed    []     Refused        [x]           Pneumovax  (1 yr after Prevnar)   [x]        Prescribed    []     Refused        []           Prevnar 13  (1 yr after Pneumo)   [x]        Prescribed    []     Refused        []           Hep B     [x]        Prescribed    []     Refused        []           Zostavax  (Age 60 and older)   []        Prescribed    []     Refused        []      needs     Immunization History   Administered Date(s) Administered   • Td 2007   • Tdap 2017     Health Maintenance   Topic Date Due   • HEPATITIS C SCREENING  2017   • COLONOSCOPY  2017   • ZOSTER VACCINE  2017   • TDAP/TD VACCINES (2 - Td) 2017   • INFLUENZA VACCINE  2017   • MAMMOGRAM  2019   • PAP SMEAR  2020        Diabetes  [] Yes  [x] No   N/A      Date     Eye Exam     []            []   Complete         Date:  Where:       Foot Exam     []         []   Complete          Obesity Counseling     []       []   Complete     No results found for: HGBA1C, MICROALBUR    Additional Testing      Date     Colorectal Screening       []   N/A   [x]   Complete         Date: 16    Where:  Needs in      Pap      []   N/A   [x]   Complete   Date:    Where:       Mammogram        []   N/A   [x]   Complete Date:    Where:     PSA  (Over age 50)    [x]   N/A   []   Complete   Date:    Where:     US Aorta  (For male smokers, age 65)     [x]   N/A   []   Complete   Date:    Where:     CT for Smoker  (Age 55-75, 30 pk yr)    [x]   N/A   []   Complete   Date:    Where:     Bone Density/DEXA      []   N/A   []   Complete   Date:    Follow-up:     Hep. C  (  "[id number])      []   N/A   []   Complete   Date:  Needs   Where:     Results for orders placed or performed in visit on 06/29/17   Comprehensive Metabolic Panel   Result Value Ref Range    Glucose 81 70 - 100 mg/dL    BUN 15 9 - 23 mg/dL    Creatinine 0.90 0.60 - 1.30 mg/dL    Sodium 139 132 - 146 mmol/L    Potassium 4.1 3.5 - 5.5 mmol/L    Chloride 101 99 - 109 mmol/L    CO2 29.0 20.0 - 31.0 mmol/L    Calcium 10.3 8.7 - 10.4 mg/dL    Total Protein 7.7 5.7 - 8.2 g/dL    Albumin 4.30 3.20 - 4.80 g/dL    ALT (SGPT) 20 7 - 40 U/L    AST (SGOT) 22 0 - 33 U/L    Alkaline Phosphatase 58 25 - 100 U/L    Total Bilirubin 0.5 0.3 - 1.2 mg/dL    eGFR  African Amer 77 >60 mL/min/1.73    Globulin 3.4 gm/dL    A/G Ratio 1.3 (L) 1.5 - 2.5 g/dL    BUN/Creatinine Ratio 16.7 7.0 - 25.0    Anion Gap 9.0 3.0 - 11.0 mmol/L   CBC (No Diff)   Result Value Ref Range    WBC 5.98 3.50 - 10.80 10*3/mm3    RBC 6.32 (H) 3.89 - 5.14 10*6/mm3    Hemoglobin 14.5 11.5 - 15.5 g/dL    Hematocrit 46.1 (H) 34.5 - 44.0 %    MCV 72.9 (L) 80.0 - 99.0 fL    MCH 22.9 (L) 27.0 - 31.0 pg    MCHC 31.5 (L) 32.0 - 36.0 g/dL    RDW 15.2 (H) 11.3 - 14.5 %    RDW-SD 40.1 37.0 - 54.0 fl    MPV 11.4 6.0 - 12.0 fL    Platelets 220 150 - 450 10*3/mm3   TSH   Result Value Ref Range    TSH 2.084 0.350 - 5.350 mIU/mL            /88 (BP Location: Left arm, Patient Position: Sitting, Cuff Size: Large Adult)  Pulse 90  Temp 97.3 °F (36.3 °C) (Temporal Artery )   Ht 63\" (160 cm)  Wt 188 lb 9.6 oz (85.5 kg)  SpO2 98%  BMI 33.41 kg/m2      Physical Exam   Constitutional: She is oriented to person, place, and time. She appears well-developed and well-nourished. No distress.   HENT:   Head: Normocephalic and atraumatic.   Right Ear: External ear normal.   Left Ear: External ear normal.   Nose: Nose normal.   Mouth/Throat: Oropharynx is clear and moist.   Eyes: Conjunctivae and EOM are normal. Pupils are equal, round, and reactive to light. Right eye exhibits no " discharge. Left eye exhibits no discharge. No scleral icterus.   Neck: Normal range of motion. Neck supple. No JVD (no bruits) present. No thyromegaly present.   Cardiovascular: Normal rate, regular rhythm, normal heart sounds and intact distal pulses.  Exam reveals no gallop and no friction rub.    No murmur heard.  Pulmonary/Chest: Effort normal and breath sounds normal. No respiratory distress. She has no wheezes. She has no rales. She exhibits no tenderness.   Abdominal: Soft. Normal appearance and bowel sounds are normal. She exhibits no distension and no mass. There is no hepatosplenomegaly. There is no tenderness. There is no rebound and no guarding. No hernia.   Genitourinary:   Genitourinary Comments: Deferred, ob/gyn   Musculoskeletal: Normal range of motion. She exhibits no edema, tenderness or deformity.   Neurological: She is alert and oriented to person, place, and time. She has normal reflexes. No cranial nerve deficit. She exhibits normal muscle tone.   Skin: Skin is warm and dry. No rash noted. She is not diaphoretic. No erythema. No pallor.   Psychiatric: She has a normal mood and affect. Her behavior is normal. Judgment and thought content normal.   Nursing note and vitals reviewed.              Counseling provided on weight management.    Diagnoses and all orders for this visit:    Wellness examination  -     Comprehensive Metabolic Panel; Future  -     Lipid Panel; Future  -     Vitamin D 25 Hydroxy; Future  -     Hepatitis C Antibody; Future  -     Tdap Vaccine Greater Than or Equal To 8yo IM  -     zoster vaccine live (ZOSTAVAX) 90060 UNT/0.65ML reconstituted suspension; Inject 1 dose under the skin 1 (One) Time for 1 dose.    Acute pain of right shoulder  -     Ambulatory Referral to Physical Therapy Evaluate and treat    Hammer toe of right foot  -     Ambulatory Referral to Podiatry         Larry Yu, SYLVESTER   8/3/2017   1:25 PM

## 2017-08-14 ENCOUNTER — OFFICE VISIT (OUTPATIENT)
Dept: PULMONOLOGY | Facility: CLINIC | Age: 62
End: 2017-08-14

## 2017-08-14 VITALS
HEIGHT: 63 IN | RESPIRATION RATE: 16 BRPM | SYSTOLIC BLOOD PRESSURE: 125 MMHG | BODY MASS INDEX: 33.52 KG/M2 | HEART RATE: 86 BPM | OXYGEN SATURATION: 99 % | DIASTOLIC BLOOD PRESSURE: 78 MMHG | WEIGHT: 189.2 LBS | TEMPERATURE: 97.5 F

## 2017-08-14 DIAGNOSIS — J45.20 MILD INTERMITTENT ASTHMA WITHOUT COMPLICATION: Primary | ICD-10-CM

## 2017-08-14 DIAGNOSIS — D86.9 SARCOIDOSIS: ICD-10-CM

## 2017-08-14 DIAGNOSIS — J30.89 PERENNIAL ALLERGIC RHINITIS, UNSPECIFIED ALLERGIC RHINITIS TRIGGER: ICD-10-CM

## 2017-08-14 PROCEDURE — 99213 OFFICE O/P EST LOW 20 MIN: CPT | Performed by: NURSE PRACTITIONER

## 2017-08-14 NOTE — PROGRESS NOTES
[facility] up    CHIEF COMPLAINT    Mild intermittent asthma, seasonal allergies    HISTORY OF PRESENT ILLNESS    Kathy Asher is a 61 y.o.female here today for six-month follow-up on her mild intermittent asthma with allergic rhinitis.  Since her last visit she has had an episode of bronchitis and is completed a course of antibiotics.  Chronically she uses Flonase with Zyrtec D for her allergic rhinitis.  She also now uses some Singulair.  She has an albuterol she uses as needed for wheezing but rarely uses it.  Her last PFTs in August 2016 were normal.  She does have history of an elevated IgE level as well as multiple allergies on her RAST panel.  She does have a history of taking allergy injections.    Overall she denies any dyspnea on exertion.  She occasionally has a cough for years herself wheeze.  She very rarely has any sputum production.    She does have a remote history of sarcoidosis diagnosed in 2010 with some mediastinal lymphadenopathy.  She never required treatment, she has reserved lung function.    She has been seeing her ophthalmologist every 4 months secondary to cataracts.    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)   • Abnormal finding on lung imaging   • Asthma   • IBS (irritable bowel syndrome)   • Murmur   • Raised level of immunoglobulins   • Sarcoidosis   • Allergic rhinitis   • ANNABELLA II (cervical intraepithelial neoplasia II)   • ELA III (vulvar intraepithelial neoplasia III)       Allergies   Allergen Reactions   • Ciprofloxacin    • Etodolac        Current Outpatient Prescriptions:   •  albuterol (PROAIR HFA) 108 (90 BASE) MCG/ACT inhaler, Inhale 2 puffs every 4 (four) hours as needed for wheezing., Disp: 1 inhaler, Rfl: 11  •  Ascorbic Acid (VITAMIN C) 500 MG capsule, Take  by mouth., Disp: , Rfl:   •  azelastine (ASTELIN) 0.1 % nasal spray, U 2 SPRAYS IEN BID PRF ALLERGIES, Disp: ,  "Rfl: 11  •  azelastine (ASTEPRO) 0.15 % solution nasal spray, into each nostril., Disp: , Rfl:   •  cetirizine (ZyrTEC) 10 MG tablet, Take  by mouth., Disp: , Rfl:   •  cetirizine-pseudoephedrine (ZYRTEC-D ALLERGY & CONGESTION) 5-120 MG per 12 hr tablet, Take  by mouth., Disp: , Rfl:   •  fluticasone (FLONASE) 50 MCG/ACT nasal spray, into each nostril 2 (two) times a day., Disp: , Rfl:   •  latanoprost (XALATAN) 0.005 % ophthalmic solution, Apply  to eye., Disp: , Rfl:   •  montelukast (SINGULAIR) 10 MG tablet, TK 1 T PO IN THE DEREK, Disp: , Rfl: 11  •  Multiple Minerals-Vitamins (ADVANCED CALCIUM/D/MAGNESIUM) tablet, Take  by mouth., Disp: , Rfl:   •  Multiple Vitamin (MULTI-VITAMIN) tablet, Take  by mouth., Disp: , Rfl:   •  Vitamin E 400 UNITS tablet, Take  by mouth., Disp: , Rfl:   MEDICATION LIST AND ALLERGIES REVIEWED.    Social History   Substance Use Topics   • Smoking status: Never Smoker   • Smokeless tobacco: Never Used   • Alcohol use No       FAMILY AND SOCIAL HISTORY REVIEWED.    Review of Systems   Constitutional: Negative for chills, fatigue, fever and unexpected weight change.   HENT: Negative for congestion, nosebleeds, postnasal drip, rhinorrhea, sinus pressure and trouble swallowing.    Respiratory: Negative for cough, chest tightness, shortness of breath and wheezing.    Cardiovascular: Negative for chest pain and leg swelling.   Gastrointestinal: Negative for abdominal pain, constipation, diarrhea, nausea and vomiting.   Genitourinary: Negative for dysuria, frequency, hematuria and urgency.   Musculoskeletal: Negative for myalgias.   Neurological: Negative for dizziness, weakness, numbness and headaches.   All other systems reviewed and are negative.  .    /78  Pulse 86  Temp 97.5 °F (36.4 °C)  Resp 16  Ht 63\" (160 cm)  Wt 189 lb 3.2 oz (85.8 kg)  SpO2 99% Comment: RA  BMI 33.52 kg/m2    Physical Exam   Constitutional: She is oriented to person, place, and time. She appears " well-developed and well-nourished.   HENT:   Head: Normocephalic and atraumatic.   Eyes: EOM are normal. Pupils are equal, round, and reactive to light.   Neck: Normal range of motion. Neck supple.   Cardiovascular: Normal rate and regular rhythm.    No murmur heard.  Pulmonary/Chest: Effort normal and breath sounds normal. No respiratory distress. She has no wheezes. She has no rales.   Abdominal: Soft. Bowel sounds are normal. She exhibits no distension.   Musculoskeletal: Normal range of motion. She exhibits no edema.   Neurological: She is alert and oriented to person, place, and time.   Skin: Skin is warm and dry. No erythema.   Psychiatric: She has a normal mood and affect. Her behavior is normal.   Vitals reviewed.        RESULTS    Lab Results   Component Value Date    GLUCOSE 81 06/29/2017    BUN 15 06/29/2017    CREATININE 0.90 06/29/2017    EGFRIFAFRI 77 06/29/2017    BCR 16.7 06/29/2017    K 4.1 06/29/2017    CO2 29.0 06/29/2017    CALCIUM 10.3 06/29/2017    ALBUMIN 4.30 06/29/2017    LABIL2 1.3 (L) 06/29/2017    AST 22 06/29/2017    ALT 20 06/29/2017     Lab Results   Component Value Date    WBC 5.98 06/29/2017    HGB 14.5 06/29/2017    HCT 46.1 (H) 06/29/2017    MCV 72.9 (L) 06/29/2017     06/29/2017     CXR 6/2017  1.  Linear nodular pulmonary airspace opacities are seen in the right  mid and upper lung and minimal increased markings are seen in the left  perihilar area, all findings stable when compared to 09/19/2016.  2.  No evidence of new active or progressive lung disease is noted.  3.  Further, the heart size is normal. Vascularity is unremarkable and  there is no edema or free fluid.    PROBLEM LIST    Problem List Items Addressed This Visit        Respiratory    Asthma - Primary    Allergic rhinitis       Other    Sarcoidosis    Overview     Diagnosed by lymph node biopsy in 2000.  She has well preserved spirometry.                     DISCUSSION    Currently her mild intermittent  asthma and allergic rhinitis is well controlled on her current regimen.  She does have a history of elevated IgE as well as several allergic component on her RAST panel.  She has had a couple exacerbations with the use of antibiotics and steroids over the last year.  If she continues have worsening symptoms or the use of antibiotic or steroids she may need a repeat in her IgE and eosinophil count to see if she needs continued progressive support in her management including Xolair or Nucala.    Follow up in 6 month or sooner as needed.      Maggi Valdes, APRN  08/14/20171:00 PM  Electronically signed     Please note that portions of this note were completed with a voice recognition program. Efforts were made to edit the dictations, but occasionally words are mistranscribed.      CC: Gilberto York MD

## 2017-10-13 ENCOUNTER — OFFICE VISIT (OUTPATIENT)
Dept: PULMONOLOGY | Facility: CLINIC | Age: 62
End: 2017-10-13

## 2017-10-13 VITALS
WEIGHT: 190.4 LBS | OXYGEN SATURATION: 99 % | DIASTOLIC BLOOD PRESSURE: 74 MMHG | RESPIRATION RATE: 16 BRPM | TEMPERATURE: 98 F | HEART RATE: 82 BPM | SYSTOLIC BLOOD PRESSURE: 114 MMHG | BODY MASS INDEX: 33.73 KG/M2 | HEIGHT: 63 IN

## 2017-10-13 DIAGNOSIS — J45.20 MILD INTERMITTENT ASTHMA WITHOUT COMPLICATION: ICD-10-CM

## 2017-10-13 DIAGNOSIS — Z87.01 HISTORY OF PNEUMONIA: ICD-10-CM

## 2017-10-13 DIAGNOSIS — R91.8 ABNORMAL FINDING ON LUNG IMAGING: Primary | ICD-10-CM

## 2017-10-13 PROCEDURE — 99213 OFFICE O/P EST LOW 20 MIN: CPT | Performed by: NURSE PRACTITIONER

## 2017-10-13 RX ORDER — LEVOFLOXACIN 750 MG/1
TABLET ORAL
Refills: 0 | COMMUNITY
Start: 2017-10-10 | End: 2017-11-03

## 2017-10-13 NOTE — PROGRESS NOTES
[facility] Pulmonary Follow up    CHIEF COMPLAINT    ER follow-up    HISTORY OF PRESENT ILLNESS    Kathy Asher is a 61 y.o.female here today for an ER follow-up.    She presented to the [facility] ER on 10/9/17.  Per their records she had a complaint of dizziness, but she is adamant to me that she was not dizzy.  She describes it as a fuzzy feeling in her head as well as her ears ringing.  She had a CT and MRI of the head which were both negative for any acute intracranial abnormalities.  She also had a chest x-ray performed which showed a vague opacity in the right upper lobe.  This density was thought to be possibly pneumonia or a mass.  She was prescribed Levaquin 750 mg for 5 days and recommended follow-up imaging.    The ER instructed her to follow-up with her PCP in 2-3 days but she opted to follow-up in our office.    She denies fever, chills, chest pain or tightness, cough, or sputum production.  She continues to complain of a fuzzy-type pressure in her head and face as well as ongoing ear issues.    She has mild intermittent asthma with allergic rhinitis.  She remains on pro-air, Flonase, and Singulair which is well controlled her symptoms.        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)   • Abnormal finding on lung imaging   • Asthma   • IBS (irritable bowel syndrome)   • Murmur   • Raised level of immunoglobulins   • Sarcoidosis   • Allergic rhinitis   • ANNABELLA II (cervical intraepithelial neoplasia II)   • ELA III (vulvar intraepithelial neoplasia III)       Allergies   Allergen Reactions   • Ciprofloxacin    • Etodolac        Current Outpatient Prescriptions:   •  albuterol (PROAIR HFA) 108 (90 BASE) MCG/ACT inhaler, Inhale 2 puffs every 4 (four) hours as needed for wheezing., Disp: 1 inhaler, Rfl: 11  •  Ascorbic Acid (VITAMIN C) 500 MG capsule, Take  by mouth., Disp: , Rfl:   •  azelastine (ASTELIN) 0.1 %  "nasal spray, U 2 SPRAYS IEN BID PRF ALLERGIES, Disp: , Rfl: 11  •  azelastine (ASTEPRO) 0.15 % solution nasal spray, into each nostril., Disp: , Rfl:   •  cetirizine (ZyrTEC) 10 MG tablet, Take  by mouth., Disp: , Rfl:   •  cetirizine-pseudoephedrine (ZYRTEC-D ALLERGY & CONGESTION) 5-120 MG per 12 hr tablet, Take  by mouth., Disp: , Rfl:   •  fluticasone (FLONASE) 50 MCG/ACT nasal spray, into each nostril 2 (two) times a day., Disp: , Rfl:   •  latanoprost (XALATAN) 0.005 % ophthalmic solution, Apply  to eye., Disp: , Rfl:   •  levoFLOXacin (LEVAQUIN) 750 MG tablet, TK 1 T PO Q 24 H  FOR 5 DAYS, Disp: , Rfl: 0  •  montelukast (SINGULAIR) 10 MG tablet, TK 1 T PO IN THE DEREK, Disp: , Rfl: 11  •  Multiple Minerals-Vitamins (ADVANCED CALCIUM/D/MAGNESIUM) tablet, Take  by mouth., Disp: , Rfl:   •  Multiple Vitamin (MULTI-VITAMIN) tablet, Take  by mouth., Disp: , Rfl:   •  Vitamin E 400 UNITS tablet, Take  by mouth., Disp: , Rfl:   MEDICATION LIST AND ALLERGIES REVIEWED.    Social History   Substance Use Topics   • Smoking status: Never Smoker   • Smokeless tobacco: Never Used   • Alcohol use No       FAMILY AND SOCIAL HISTORY REVIEWED.    Review of Systems   Constitutional: Negative for chills, fatigue, fever and unexpected weight change.   HENT: Positive for ear pain. Negative for congestion, nosebleeds, postnasal drip, rhinorrhea, sinus pressure and trouble swallowing.    Respiratory: Negative for cough, chest tightness, shortness of breath and wheezing.    Cardiovascular: Negative for chest pain and leg swelling.   Gastrointestinal: Negative for abdominal pain, constipation, diarrhea, nausea and vomiting.   Genitourinary: Negative for dysuria, frequency, hematuria and urgency.   Musculoskeletal: Negative for myalgias.   Neurological: Negative for dizziness, weakness, numbness and headaches.        \"fuzzy\" sensation in her head   All other systems reviewed and are negative.  .    /74  Pulse 82  Temp 98 °F (36.7 " "°C)  Resp 16  Ht 63\" (160 cm)  Wt 190 lb 6.4 oz (86.4 kg)  SpO2 99% Comment: RA  BMI 33.73 kg/m2    Physical Exam   Constitutional: She appears well-developed. No distress.   HENT:   Head: Normocephalic.   Neck: Neck supple.   Cardiovascular: Normal rate, regular rhythm and normal heart sounds.    No murmur heard.  Pulmonary/Chest: Effort normal and breath sounds normal. No respiratory distress. She has no wheezes.   Musculoskeletal: Normal range of motion. She exhibits no edema.   Neurological: She is alert.   Skin: Skin is warm and dry.   Psychiatric: She has a normal mood and affect. Her behavior is normal.   Vitals reviewed.        RESULTS    Chest x-ray performed in [facility] ED showed right upper lobe density with differential of pneumonia or mass.  Continue chest rated graphic follow-up was recommended    Chest x-ray from 6/29/17 showed linear nodular pulmonary airspace opacities in the right mid and upper lung, stable from previous chest x-ray on 9/19/16.    Chest x-ray from 12/9/14 showed persistent right upper lobe and left perihilar scarring/fibrosis.      PROBLEM LIST    Problem List Items Addressed This Visit        Other    Abnormal finding on lung imaging - Primary      Other Visit Diagnoses     History of pneumonia                DISCUSSION    ER records reviewed and discussed with the patient, however she disagrees with much of what the ER reported about her complaints and physical exam findings.  I do not have the imaging available from that visit, only the radiologist report.  However reviewing her chest x-rays back to December 2014 show persistent abnormalities in the right upper lobe so this could be a chronic finding rather than an acute pneumonia. I have instructed her to still complete her Levaquin as prescribed.  We will follow up with a chest x-ray in 6 weeks.  I also encouraged her to follow-up with her PCP as she is still complaining about issues with her ears.  They may " determine if an ENT or other specialist referral is warranted.        Keyanna Marquis, APRN  10/13/616376:10 AM  Electronically signed     Please note that portions of this note were completed with a voice recognition program. Efforts were made to edit the dictations, but occasionally words are mistranscribed.      CC: Gilberto York MD

## 2017-11-03 ENCOUNTER — OFFICE VISIT (OUTPATIENT)
Dept: FAMILY MEDICINE CLINIC | Facility: CLINIC | Age: 62
End: 2017-11-03

## 2017-11-03 VITALS
OXYGEN SATURATION: 99 % | SYSTOLIC BLOOD PRESSURE: 130 MMHG | DIASTOLIC BLOOD PRESSURE: 88 MMHG | BODY MASS INDEX: 34.02 KG/M2 | HEART RATE: 80 BPM | TEMPERATURE: 96.9 F | WEIGHT: 192 LBS | HEIGHT: 63 IN

## 2017-11-03 DIAGNOSIS — J45.20 MILD INTERMITTENT ASTHMA WITHOUT COMPLICATION: ICD-10-CM

## 2017-11-03 DIAGNOSIS — J30.89 CHRONIC NON-SEASONAL ALLERGIC RHINITIS, UNSPECIFIED TRIGGER: Primary | ICD-10-CM

## 2017-11-03 PROCEDURE — 99213 OFFICE O/P EST LOW 20 MIN: CPT | Performed by: NURSE PRACTITIONER

## 2017-11-03 NOTE — PATIENT INSTRUCTIONS
Allergic Rhinitis  Allergic rhinitis is when the mucous membranes in the nose respond to allergens. Allergens are particles in the air that cause your body to have an allergic reaction. This causes you to release allergic antibodies. Through a chain of events, these eventually cause you to release histamine into the blood stream. Although meant to protect the body, it is this release of histamine that causes your discomfort, such as frequent sneezing, congestion, and an itchy, runny nose.   CAUSES  Seasonal allergic rhinitis (hay fever) is caused by pollen allergens that may come from grasses, trees, and weeds. Year-round allergic rhinitis (perennial allergic rhinitis) is caused by allergens such as house dust mites, pet dander, and mold spores.  SYMPTOMS  · Nasal stuffiness (congestion).  · Itchy, runny nose with sneezing and tearing of the eyes.  DIAGNOSIS  Your health care provider can help you determine the allergen or allergens that trigger your symptoms. If you and your health care provider are unable to determine the allergen, skin or blood testing may be used. Your health care provider will diagnose your condition after taking your health history and performing a physical exam. Your health care provider may assess you for other related conditions, such as asthma, pink eye, or an ear infection.  TREATMENT  Allergic rhinitis does not have a cure, but it can be controlled by:  · Medicines that block allergy symptoms. These may include allergy shots, nasal sprays, and oral antihistamines.  · Avoiding the allergen.  Hay fever may often be treated with antihistamines in pill or nasal spray forms. Antihistamines block the effects of histamine. There are over-the-counter medicines that may help with nasal congestion and swelling around the eyes. Check with your health care provider before taking or giving this medicine.  If avoiding the allergen or the medicine prescribed do not work, there are many new medicines  your health care provider can prescribe. Stronger medicine may be used if initial measures are ineffective. Desensitizing injections can be used if medicine and avoidance does not work. Desensitization is when a patient is given ongoing shots until the body becomes less sensitive to the allergen. Make sure you follow up with your health care provider if problems continue.  HOME CARE INSTRUCTIONS  It is not possible to completely avoid allergens, but you can reduce your symptoms by taking steps to limit your exposure to them. It helps to know exactly what you are allergic to so that you can avoid your specific triggers.  SEEK MEDICAL CARE IF:  · You have a fever.  · You develop a cough that does not stop easily (persistent).  · You have shortness of breath.  · You start wheezing.  · Symptoms interfere with normal daily activities.     This information is not intended to replace advice given to you by your health care provider. Make sure you discuss any questions you have with your health care provider.     Document Released: 09/12/2002 Document Revised: 01/08/2016 Document Reviewed: 08/25/2014  [employer] Patient Education ©2017 [employer]

## 2017-11-27 ENCOUNTER — OFFICE VISIT (OUTPATIENT)
Dept: PULMONOLOGY | Facility: CLINIC | Age: 62
End: 2017-11-27

## 2017-11-27 VITALS
OXYGEN SATURATION: 98 % | RESPIRATION RATE: 16 BRPM | SYSTOLIC BLOOD PRESSURE: 118 MMHG | DIASTOLIC BLOOD PRESSURE: 78 MMHG | WEIGHT: 193.8 LBS | TEMPERATURE: 97.4 F | BODY MASS INDEX: 34.34 KG/M2 | HEIGHT: 63 IN | HEART RATE: 89 BPM

## 2017-11-27 DIAGNOSIS — J45.20 MILD INTERMITTENT ASTHMA WITHOUT COMPLICATION: ICD-10-CM

## 2017-11-27 DIAGNOSIS — D86.9 SARCOIDOSIS: Primary | ICD-10-CM

## 2017-11-27 DIAGNOSIS — R91.8 ABNORMAL FINDING ON LUNG IMAGING: ICD-10-CM

## 2017-11-27 DIAGNOSIS — J30.89 CHRONIC NON-SEASONAL ALLERGIC RHINITIS, UNSPECIFIED TRIGGER: ICD-10-CM

## 2017-11-27 PROCEDURE — 99213 OFFICE O/P EST LOW 20 MIN: CPT | Performed by: NURSE PRACTITIONER

## 2017-11-27 NOTE — PROGRESS NOTES
[facility] Pulmonary Follow up    CHIEF COMPLAINT    Follow-up abnormal chest x-ray    HISTORY OF PRESENT ILLNESS    Kathy Asher is a 61 y.o.female here today for follow-up on possible pneumonia.     I saw her on 10/13/17. She had just been seen in the [facility] ER on 10/9/17.  Per their records she had a complaint of dizziness, but she was adamant to me that she was not dizzy.  She described it as a fuzzy feeling in her head as well as her ears ringing.  She had a CT and MRI of the head which were both negative for any acute intracranial abnormalities.  She also had a chest x-ray performed which showed a vague opacity in the right upper lobe.  This density was thought to be possibly pneumonia or a mass.  She was prescribed Levaquin 750 mg for 5 days and recommended follow-up imaging.     The ER instructed her to follow-up with her PCP in 2-3 days but she opted to follow-up in our office. I reviewed her previous chest x-rays which showed chronic right upper lobe scarring. She returns today for a follow-up chest x-ray.      She denies fever, chills, chest pain or tightness, cough, or sputum production.  She continues to complain of ear issues as well as some problems with balance. She has been in PT for some sort of foot issue but reports she has had several near-falls.      She has mild intermittent asthma with allergic rhinitis.  She remains on pro-air, Flonase, and Singulair which is well controlled her symptoms.        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)   • Abnormal finding on lung imaging   • Asthma   • IBS (irritable bowel syndrome)   • Murmur   • Raised level of immunoglobulins   • Sarcoidosis   • Allergic rhinitis   • ANNABELLA II (cervical intraepithelial neoplasia II)   • ELA III (vulvar intraepithelial neoplasia III)       Allergies   Allergen Reactions   • Ciprofloxacin    • Etodolac        Current  "Outpatient Prescriptions:   •  albuterol (PROAIR HFA) 108 (90 BASE) MCG/ACT inhaler, Inhale 2 puffs every 4 (four) hours as needed for wheezing., Disp: 1 inhaler, Rfl: 11  •  Ascorbic Acid (VITAMIN C) 500 MG capsule, Take  by mouth., Disp: , Rfl:   •  azelastine (ASTELIN) 0.1 % nasal spray, U 2 SPRAYS IEN BID PRF ALLERGIES, Disp: , Rfl: 11  •  cetirizine (ZyrTEC) 10 MG tablet, Take  by mouth., Disp: , Rfl:   •  cetirizine-pseudoephedrine (ZYRTEC-D ALLERGY & CONGESTION) 5-120 MG per 12 hr tablet, Take  by mouth., Disp: , Rfl:   •  fluticasone (FLONASE) 50 MCG/ACT nasal spray, into each nostril 2 (two) times a day., Disp: , Rfl:   •  latanoprost (XALATAN) 0.005 % ophthalmic solution, Apply  to eye., Disp: , Rfl:   •  montelukast (SINGULAIR) 10 MG tablet, TK 1 T PO IN THE DEREK, Disp: , Rfl: 11  •  Multiple Vitamin (MULTI-VITAMIN) tablet, Take  by mouth., Disp: , Rfl:   •  Vitamin E 400 UNITS tablet, Take  by mouth., Disp: , Rfl:   MEDICATION LIST AND ALLERGIES REVIEWED.    Social History   Substance Use Topics   • Smoking status: Never Smoker   • Smokeless tobacco: Never Used   • Alcohol use No       FAMILY AND SOCIAL HISTORY REVIEWED.    Review of Systems   Constitutional: Negative for chills, fatigue, fever and unexpected weight change.   HENT: Positive for ear pain. Negative for congestion, nosebleeds, postnasal drip, rhinorrhea, sinus pressure and trouble swallowing.    Respiratory: Negative for cough, chest tightness, shortness of breath and wheezing.    Cardiovascular: Negative for chest pain and leg swelling.   Gastrointestinal: Negative for abdominal pain, constipation, diarrhea, nausea and vomiting.   Genitourinary: Negative for dysuria, frequency, hematuria and urgency.   Musculoskeletal: Negative for myalgias.   Neurological: Negative for dizziness, weakness, numbness and headaches.        \"fuzzy\" sensation in her head   All other systems reviewed and are negative.  .    /78  Pulse 89  Temp 97.4 °F " "(36.3 °C)  Resp 16  Ht 63\" (160 cm)  Wt 193 lb 12.8 oz (87.9 kg)  SpO2 98% Comment: RA  BMI 34.33 kg/m2    Physical Exam   Constitutional: She appears well-developed. No distress.   HENT:   Head: Normocephalic.   Right Ear: External ear normal. No drainage.   Left Ear: External ear normal. No drainage.   Neck: Neck supple.   Cardiovascular: Normal rate, regular rhythm and normal heart sounds.    No murmur heard.  Pulmonary/Chest: Effort normal and breath sounds normal. No stridor. No respiratory distress. She has no wheezes.   Musculoskeletal: Normal range of motion. She exhibits no edema.   Neurological: She is alert.   Skin: Skin is warm and dry.   Psychiatric: She has a normal mood and affect. Her behavior is normal.   Vitals reviewed.        RESULTS    PA and Lateral chest x-ray performed in office today shows chronic right upper lobe opacities, stable from prior exams.     PROBLEM LIST    Problem List Items Addressed This Visit        Respiratory    Asthma    Allergic rhinitis       Other    Abnormal finding on lung imaging    Overview     Description: A.  Chest x-ray on 07/09/2012 reports extensive, masslike infiltrative disease of the right upper lobe and mild left perihilar disease.  B.  CT scan of the chest 06/04/2014 reports interval improvement in the right perihilar masslike opacity and stability in the left perihilar opacity, pulmonary nodules and lymphadenopathy.         Sarcoidosis - Primary    Overview     Diagnosed by lymph node biopsy in 2000.  She has well preserved spirometry.             Relevant Orders    XR Chest PA & Lateral            DISCUSSION    Her chest x-ray remains similar to prior and she is asymptomatic from a pulmonary standpoint. Continue on current medication regimen for asthma and rhinitis.     I recommended that she follow-up with her PCP to further discuss her issues with balance, her ear, and possible ENT referral.    Follow-up in 6 months.     I spent 15 minutes with " the patient. I spent > 50% percent of this time counseling and discussing diagnosis, diagnostic testing and treatment options.    Keyanna Marquis, APRN  11/27/201711:18 AM  Electronically signed     Please note that portions of this note were completed with a voice recognition program. Efforts were made to edit the dictations, but occasionally words are mistranscribed.      CC: Gilberto York MD

## 2018-01-04 ENCOUNTER — TELEPHONE (OUTPATIENT)
Dept: FAMILY MEDICINE CLINIC | Facility: CLINIC | Age: 63
End: 2018-01-04

## 2018-01-04 ENCOUNTER — OFFICE VISIT (OUTPATIENT)
Dept: FAMILY MEDICINE CLINIC | Facility: CLINIC | Age: 63
End: 2018-01-04

## 2018-01-04 VITALS
BODY MASS INDEX: 33.83 KG/M2 | TEMPERATURE: 97.8 F | WEIGHT: 191 LBS | OXYGEN SATURATION: 97 % | SYSTOLIC BLOOD PRESSURE: 112 MMHG | HEART RATE: 81 BPM | DIASTOLIC BLOOD PRESSURE: 72 MMHG

## 2018-01-04 DIAGNOSIS — J20.9 ACUTE BRONCHITIS, UNSPECIFIED ORGANISM: Primary | ICD-10-CM

## 2018-01-04 PROCEDURE — 99214 OFFICE O/P EST MOD 30 MIN: CPT | Performed by: NURSE PRACTITIONER

## 2018-01-04 RX ORDER — BROMPHENIRAMINE MALEATE, PSEUDOEPHEDRINE HYDROCHLORIDE, AND DEXTROMETHORPHAN HYDROBROMIDE 2; 30; 10 MG/5ML; MG/5ML; MG/5ML
5 SYRUP ORAL 4 TIMES DAILY PRN
Qty: 120 ML | Refills: 1 | Status: SHIPPED | OUTPATIENT
Start: 2018-01-04 | End: 2018-12-21

## 2018-01-04 RX ORDER — FEXOFENADINE HCL 180 MG/1
180 TABLET ORAL DAILY
COMMUNITY
End: 2018-03-21 | Stop reason: ALTCHOICE

## 2018-01-04 RX ORDER — AMOXICILLIN AND CLAVULANATE POTASSIUM 875; 125 MG/1; MG/1
1 TABLET, FILM COATED ORAL
Qty: 14 TABLET | Refills: 0 | Status: SHIPPED | OUTPATIENT
Start: 2018-01-04 | End: 2018-01-29

## 2018-01-04 NOTE — PROGRESS NOTES
Chief Complaint   Patient presents with   • URI       Subjective   Kathy Asher is a 62 y.o. female    URI    This is a new problem. Episode onset: 8 days. The problem has been gradually improving. There has been no fever. Associated symptoms include congestion, coughing (prod yellowish), a plugged ear sensation, rhinorrhea, sinus pain, sneezing and wheezing. Pertinent negatives include no abdominal pain, chest pain, diarrhea, dysuria, ear pain, headaches, joint pain, joint swelling, nausea, neck pain, rash, sore throat, swollen glands or vomiting. Treatments tried: Mucinex, Chest rub, has helped. The treatment provided moderate relief.   Albuterol Inhl helps. Exposed to Granddaughter with Flu A.       Allergies   Allergen Reactions   • Ciprofloxacin    • Etodolac      Past Medical History:   Diagnosis Date   • Abnormal finding on lung imaging     CT scan of the chest 06/04/2014 reports interval improvement in the right perihilar masslike opacity and stability in the left perihilar opacity, pulmonary nodules and lymphadenopathy.   • Abnormal Pap smear of cervix    • Allergic rhinitis    • Asthma    • Cervical dysplasia     CIN2   • History of chest x-ray 08/27/2015    interval increase in patchy opacity within the right upper lobe compared to prior study; previously described left mid lung opacity appears similar to the prior study; dextrocurvature of thoracic spine with mild degenerative changes of the spine    • History of chest x-ray 07/09/2012    extensive, masslike and infiltrating disease of the right upper lobe and mild left perihilar disease   • History of chest x-ray 11/17/2010    chest is hyperinflated; infiltrate in right upper lobe apical segment with some associated adenopathy and old granulomatous changes    • History of histoplasmosis    • History of PFTs 06/06/2014    data is acceptable and reproducible; pt given 3 puffs of albuterol; pt gave good effort    • History of PFTs 08/12/2013    data is  acceptable and reproducible; pt given 3 puffs of albuterol; pt gave good effort    • History of PFTs 01/24/2011    pt gave good effort; spirometry is acceptable and reproducible;    • IBS (irritable bowel syndrome)    • Sarcoidosis     Diagnosed by lymph node biopsy in 2000.  She has well preserved spirometry.   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   • Vulvar dysplasia     VIN3      Past Surgical History:   Procedure Laterality Date   • BREAST BIOPSY     • COLONOSCOPY     • DIAGNOSTIC LAPAROSCOPY EXPLORATORY LAPAROTOMY     • EXCISION LESION  06/27/2016    vaginal WLE   • LYMPH NODE BIOPSY     • MOUTH SURGERY       Social History     Social History   • Marital status: Single     Spouse name: N/A   • Number of children: N/A   • Years of education: N/A     Occupational History   • retired       Social History Main Topics   • Smoking status: Never Smoker   • Smokeless tobacco: Never Used   • Alcohol use No   • Drug use: No   • Sexual activity: Defer      Comment: single     Other Topics Concern   • Not on file     Social History Narrative        Current Outpatient Prescriptions:   •  albuterol (PROAIR HFA) 108 (90 BASE) MCG/ACT inhaler, Inhale 2 puffs every 4 (four) hours as needed for wheezing., Disp: 1 inhaler, Rfl: 11  •  Ascorbic Acid (VITAMIN C) 500 MG capsule, Take  by mouth., Disp: , Rfl:   •  azelastine (ASTELIN) 0.1 % nasal spray, U 2 SPRAYS IEN BID PRF ALLERGIES, Disp: , Rfl: 11  •  cetirizine (ZyrTEC) 10 MG tablet, Take  by mouth., Disp: , Rfl:   •  cetirizine-pseudoephedrine (ZYRTEC-D ALLERGY & CONGESTION) 5-120 MG per 12 hr tablet, Take  by mouth., Disp: , Rfl:   •  fexofenadine (ALLEGRA) 180 MG tablet, Take 180 mg by mouth Daily., Disp: , Rfl:   •  fluticasone (FLONASE) 50 MCG/ACT nasal spray, into each nostril 2 (two) times a day., Disp: , Rfl:   •  latanoprost (XALATAN) 0.005 % ophthalmic solution, Apply  to eye., Disp: , Rfl:   •  montelukast (SINGULAIR) 10 MG tablet, TK 1 T PO IN THE DEREK,  Disp: , Rfl: 11  •  Multiple Vitamin (MULTI-VITAMIN) tablet, Take  by mouth., Disp: , Rfl:   •  Vitamin E 400 UNITS tablet, Take  by mouth., Disp: , Rfl:   •  amoxicillin-clavulanate (AUGMENTIN) 875-125 MG per tablet, Take 1 tablet by mouth Daily., Disp: 14 tablet, Rfl: 0  •  brompheniramine-pseudoephedrine-DM 30-2-10 MG/5ML syrup, Take 5 mL by mouth 4 (Four) Times a Day As Needed for Cough., Disp: 120 mL, Rfl: 1     Review of Systems   Constitutional: Positive for fatigue. Negative for chills and fever.   HENT: Positive for congestion, rhinorrhea, sinus pain and sneezing. Negative for ear pain and sore throat.    Respiratory: Positive for cough (prod yellowish) and wheezing.    Cardiovascular: Negative for chest pain.   Gastrointestinal: Negative for abdominal pain, constipation, diarrhea, nausea and vomiting.   Genitourinary: Negative for difficulty urinating and dysuria.   Musculoskeletal: Positive for myalgias. Negative for joint pain and neck pain.   Skin: Negative for rash.   Neurological: Negative for headaches.   Psychiatric/Behavioral: Negative for sleep disturbance.       Objective     Vitals:    01/04/18 1157   BP: 112/72   Pulse: 81   Temp: 97.8 °F (36.6 °C)   SpO2: 97%       Results for orders placed or performed in visit on 06/29/17   Comprehensive Metabolic Panel   Result Value Ref Range    Glucose 81 70 - 100 mg/dL    BUN 15 9 - 23 mg/dL    Creatinine 0.90 0.60 - 1.30 mg/dL    Sodium 139 132 - 146 mmol/L    Potassium 4.1 3.5 - 5.5 mmol/L    Chloride 101 99 - 109 mmol/L    CO2 29.0 20.0 - 31.0 mmol/L    Calcium 10.3 8.7 - 10.4 mg/dL    Total Protein 7.7 5.7 - 8.2 g/dL    Albumin 4.30 3.20 - 4.80 g/dL    ALT (SGPT) 20 7 - 40 U/L    AST (SGOT) 22 0 - 33 U/L    Alkaline Phosphatase 58 25 - 100 U/L    Total Bilirubin 0.5 0.3 - 1.2 mg/dL    eGFR  African Amer 77 >60 mL/min/1.73    Globulin 3.4 gm/dL    A/G Ratio 1.3 (L) 1.5 - 2.5 g/dL    BUN/Creatinine Ratio 16.7 7.0 - 25.0    Anion Gap 9.0 3.0 - 11.0  mmol/L   CBC (No Diff)   Result Value Ref Range    WBC 5.98 3.50 - 10.80 10*3/mm3    RBC 6.32 (H) 3.89 - 5.14 10*6/mm3    Hemoglobin 14.5 11.5 - 15.5 g/dL    Hematocrit 46.1 (H) 34.5 - 44.0 %    MCV 72.9 (L) 80.0 - 99.0 fL    MCH 22.9 (L) 27.0 - 31.0 pg    MCHC 31.5 (L) 32.0 - 36.0 g/dL    RDW 15.2 (H) 11.3 - 14.5 %    RDW-SD 40.1 37.0 - 54.0 fl    MPV 11.4 6.0 - 12.0 fL    Platelets 220 150 - 450 10*3/mm3   TSH   Result Value Ref Range    TSH 2.084 0.350 - 5.350 mIU/mL       Physical Exam   Constitutional: She is oriented to person, place, and time. She appears well-developed and well-nourished.   HENT:   Head: Normocephalic and atraumatic.   Right Ear: Hearing and external ear normal.   Left Ear: Hearing and external ear normal.   Nose: Rhinorrhea present. Right sinus exhibits no maxillary sinus tenderness and no frontal sinus tenderness. Left sinus exhibits no maxillary sinus tenderness and no frontal sinus tenderness.   Mouth/Throat: Uvula is midline and mucous membranes are normal. Posterior oropharyngeal erythema present.   Cardiovascular: Normal rate, regular rhythm and normal heart sounds.    Pulmonary/Chest: Effort normal. No respiratory distress. She has wheezes (mid, bronchial).   Neurological: She is alert and oriented to person, place, and time.   Skin: Skin is warm and dry.   Psychiatric: She has a normal mood and affect. Her behavior is normal.   Vitals reviewed.      Assessment/Plan   Problem List Items Addressed This Visit     None      Visit Diagnoses     Acute bronchitis, unspecified organism    -  Primary    Relevant Medications    fexofenadine (ALLEGRA) 180 MG tablet    amoxicillin-clavulanate (AUGMENTIN) 875-125 MG per tablet    brompheniramine-pseudoephedrine-DM 30-2-10 MG/5ML syrup      Don't take Zyrtec D with new meds. Patient to take medications as directed. Make sure to take all of them. Return if no improvement or worsens in 5-7 days. Patient was encouraged to keep me informed of any  acute changes, lack of improvement, or any new concerning symptoms.Plan of care discussed with pt. They verbalized understanding and agreement.     Counseling provided on bronchitis.    Larry Yu, APRN   1/4/2018   1:13 PM

## 2018-01-04 NOTE — TELEPHONE ENCOUNTER
AUGMENTIN 875-125 IS SENT AS TAKE 1 PER DAY FOR 14 DAYS BUT USUALLY 2 PER DAY FOR 7 DAYS . NEED CLARIFICATION.

## 2018-01-29 ENCOUNTER — OFFICE VISIT (OUTPATIENT)
Dept: GYNECOLOGIC ONCOLOGY | Facility: CLINIC | Age: 63
End: 2018-01-29

## 2018-01-29 VITALS
BODY MASS INDEX: 33.83 KG/M2 | WEIGHT: 191 LBS | TEMPERATURE: 97.6 F | RESPIRATION RATE: 16 BRPM | OXYGEN SATURATION: 98 % | DIASTOLIC BLOOD PRESSURE: 71 MMHG | SYSTOLIC BLOOD PRESSURE: 175 MMHG | HEART RATE: 90 BPM

## 2018-01-29 DIAGNOSIS — D07.2 VAIN III (VAGINAL INTRAEPITHELIAL NEOPLASIA GRADE III): Primary | ICD-10-CM

## 2018-01-29 DIAGNOSIS — N87.1 CIN II (CERVICAL INTRAEPITHELIAL NEOPLASIA II): ICD-10-CM

## 2018-01-29 DIAGNOSIS — D07.1 VIN III (VULVAR INTRAEPITHELIAL NEOPLASIA III): ICD-10-CM

## 2018-01-29 PROCEDURE — 99213 OFFICE O/P EST LOW 20 MIN: CPT | Performed by: NURSE PRACTITIONER

## 2018-01-29 NOTE — PROGRESS NOTES
GYN ONCOLOGY FOLLOW-UP    Kathy Asher  [id number]  1955    Chief Complaint: Follow-up (rash around groin)        History of present illness:  Kathy Asher is a 62 y.o. 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 She is s/p upper vaginectomy on 6/27/2017. She also has a h/o ELA III and ANNABELLA II, see history below. Last pap smear on 7/27/2017 LSIL, stable from previous paps.   Upon arrival today Kathy reports recent course of Augmentin for a URI. After this she developed a rash to the external genitalia that was itching and loose stools x 1 week. She describes intermittent pelvic cramping sensation. She has applied Desitin and Vaseline externally to relieve itching.  This has been effective and she feels area is healing.  She also reports bowels have corrected themselves without intervention and are now more formed.  Otherwise, she denies vaginal bleeding, pelvic pain, concerning lesions, or changes in bowel or bladder function.      Dysplasia history:  2007: LEEP with Dr. Gupta. Recurrent LSIL pap smears every 6 months after that time  8/27/2014: Biopsy of posterior vulvar lesion, path revealed ELA III. Referred to Dr. Harrell for second opinion  10/2/2014: Colpo with biopsies, revealed mild squamous dysplasia with HPV effect  12/10/2014: LAVH/BSO with vulvar WLE. Pathology consistent with ELA III, left margin focally positive. Cervix showed extensive endocervical moderate squamous epithelial dysplasia with HPV effect (ANNABELLA II).   4/18/2016: Pap smear of vaginal cuff showed LSIL with HPV non 16/18+  4/28/2016: Vaginoscopy with biopsy, pathology revealed severe dysplasia with HPV effect  6/27/2016: WLE of vaginal dysplasia, pathology consistent with ELA III, margins negative for dysplasia but positive for HPV effect  1/31/2017: Pap smear of vaginal cuff showed LSIL, stable considering history       Oncology History:       Severe vaginal dysplasia    5/2/2016  Initial Diagnosis     Severe vaginal dysplasia         ANNABELLA II (cervical intraepithelial neoplasia II)     Initial Diagnosis     ANNABELLA II (cervical intraepithelial neoplasia II)          Past Medical History:   Diagnosis Date   • Abnormal finding on lung imaging     CT scan of the chest 06/04/2014 reports interval improvement in the right perihilar masslike opacity and stability in the left perihilar opacity, pulmonary nodules and lymphadenopathy.   • Abnormal Pap smear of cervix    • Allergic rhinitis    • Asthma    • Cervical dysplasia     CIN2   • History of chest x-ray 08/27/2015    interval increase in patchy opacity within the right upper lobe compared to prior study; previously described left mid lung opacity appears similar to the prior study; dextrocurvature of thoracic spine with mild degenerative changes of the spine    • History of chest x-ray 07/09/2012    extensive, masslike and infiltrating disease of the right upper lobe and mild left perihilar disease   • History of chest x-ray 11/17/2010    chest is hyperinflated; infiltrate in right upper lobe apical segment with some associated adenopathy and old granulomatous changes    • History of histoplasmosis    • History of PFTs 06/06/2014    data is acceptable and reproducible; pt given 3 puffs of albuterol; pt gave good effort    • History of PFTs 08/12/2013    data is acceptable and reproducible; pt given 3 puffs of albuterol; pt gave good effort    • History of PFTs 01/24/2011    pt gave good effort; spirometry is acceptable and reproducible;    • IBS (irritable bowel syndrome)    • Sarcoidosis     Diagnosed by lymph node biopsy in 2000.  She has well preserved spirometry.   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   • Vulvar dysplasia     VIN3       Past Surgical History:   Procedure Laterality Date   • BREAST BIOPSY     • COLONOSCOPY     • DIAGNOSTIC LAPAROSCOPY EXPLORATORY LAPAROTOMY     • EXCISION LESION  06/27/2016    vaginal WLE   • LYMPH NODE  BIOPSY     • MOUTH SURGERY         MEDICATIONS: The current medication list was reviewed and reconciled.     Allergies:  is allergic to ciprofloxacin and etodolac.    Family History   Problem Relation Age of Onset   • Hyperlipidemia Mother    • Hypertension Mother    • Arthritis Mother    • Heart disease Father    • Diabetes Sister    • Hyperlipidemia Sister    • Diabetes Maternal Grandmother        Review of Systems   Constitutional: Negative for appetite change, chills, fatigue, fever and unexpected weight change.   Respiratory: Negative for cough, shortness of breath and wheezing.    Cardiovascular: Negative for chest pain, palpitations and leg swelling.   Gastrointestinal: Negative for abdominal distention, abdominal pain, blood in stool, constipation, diarrhea, nausea and vomiting.   Endocrine: Negative.    Genitourinary: Negative for dyspareunia, dysuria, frequency, genital sores, hematuria, pelvic pain, urgency, vaginal bleeding, vaginal discharge and vaginal pain.   Musculoskeletal: Negative for arthralgias, gait problem and joint swelling.   Neurological: Negative for dizziness, seizures, syncope, weakness, light-headedness, numbness and headaches.   Hematological: Negative for adenopathy.   Psychiatric/Behavioral: Negative.        Physical Exam  Vital Signs: /71  Pulse 90  Temp 97.6 °F (36.4 °C) (Temporal Artery )   Resp 16  Wt 86.6 kg (191 lb)  SpO2 98%  BMI 33.83 kg/m2   General Appearance:  alert, cooperative, no apparent distress, appears stated age and obese   Neurologic/Psychiatric: A&O x 3, gait steady, appropriate affect   HEENT:  Normocephalic, without obvious abnormality, mucous membranes moist   Abdomen:   Soft, non-tender, non-distended, no organomegaly and Exam limited d/t habitus.   Lymph nodes: No cervical, supraclavicular, inguinal or axillary adenopathy noted   Extremities: Normal, atraumatic; no clubbing, cyanosis, or edema    Pelvic: External Genitalia  without lesions or  skin changes  Vagina  is pink, moist, without lesions.   Vaginal Cuff  Female Vaginal Cuff: smooth, intact, without visible lesions and pap obtained  Uterus  surgically absent  Ovaries  surgically absent bilaterallly and without palpable masses or fullness  Parametria  smooth  Rectovaginal  Female rectovaginal: deferred         Procedure Notes:  No notes on file    Assessment and Plan:    Kathy was seen today for follow-up.    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 She is understanding to call with any changes in pelvic symptoms or general GYN concerns.     Pap was done today. Call in 1 week for pap smear results. If LSIL or better, we will continue her current surveillance. Repeat in 6 months with annual exam.     Return in about 6 months (around 7/29/2018) for annual exam and ongoing VAIN surveillance or PRN.      Lina Butcher, SYLVESTER      Note: Speech recognition transcription software was used to dictate portions of this document.  An attempt at proofreading has been made though minor errors in transcription may still be present.  Please do not hesitate to call our office with any questions.

## 2018-03-21 ENCOUNTER — OFFICE VISIT (OUTPATIENT)
Dept: FAMILY MEDICINE CLINIC | Facility: CLINIC | Age: 63
End: 2018-03-21

## 2018-03-21 VITALS
SYSTOLIC BLOOD PRESSURE: 118 MMHG | HEIGHT: 63 IN | WEIGHT: 195 LBS | OXYGEN SATURATION: 98 % | HEART RATE: 90 BPM | DIASTOLIC BLOOD PRESSURE: 78 MMHG | TEMPERATURE: 98 F | BODY MASS INDEX: 34.55 KG/M2

## 2018-03-21 DIAGNOSIS — J45.20 MILD INTERMITTENT ASTHMA WITHOUT COMPLICATION: ICD-10-CM

## 2018-03-21 DIAGNOSIS — D86.9 SARCOIDOSIS: Primary | ICD-10-CM

## 2018-03-21 DIAGNOSIS — H93.12 TINNITUS OF LEFT EAR: ICD-10-CM

## 2018-03-21 PROCEDURE — 99214 OFFICE O/P EST MOD 30 MIN: CPT | Performed by: NURSE PRACTITIONER

## 2018-03-21 RX ORDER — AZITHROMYCIN 250 MG/1
TABLET, FILM COATED ORAL
Qty: 6 TABLET | Refills: 0 | Status: SHIPPED | OUTPATIENT
Start: 2018-03-21 | End: 2018-06-27

## 2018-03-21 RX ORDER — PREDNISONE 20 MG/1
20 TABLET ORAL DAILY
Qty: 5 TABLET | Refills: 0 | Status: SHIPPED | OUTPATIENT
Start: 2018-03-21 | End: 2018-06-06

## 2018-03-21 RX ORDER — CETIRIZINE HYDROCHLORIDE 10 MG/1
10 TABLET ORAL DAILY
COMMUNITY
End: 2022-03-09 | Stop reason: ALTCHOICE

## 2018-03-21 NOTE — PATIENT INSTRUCTIONS
Constipation, Adult  Constipation is when a person:  · Poops (has a bowel movement) fewer times in a week than normal.  · Has a hard time pooping.  · Has poop that is dry, hard, or bigger than normal.  Follow these instructions at home:  Eating and drinking     · Eat foods that have a lot of fiber, such as:  ¨ Fresh fruits and vegetables.  ¨ Whole grains.  ¨ Beans.  · Eat less of foods that are high in fat, low in fiber, or overly processed, such as:  ¨ French fries.  ¨ Hamburgers.  ¨ Cookies.  ¨ Candy.  ¨ Soda.  · Drink enough fluid to keep your pee (urine) clear or pale yellow.  General instructions   · Exercise regularly or as told by your doctor.  · Go to the restroom when you feel like you need to poop. Do not hold it in.  · Take over-the-counter and prescription medicines only as told by your doctor. These include any fiber supplements.  · Do pelvic floor retraining exercises, such as:  ¨ Doing deep breathing while relaxing your lower belly (abdomen).  ¨ Relaxing your pelvic floor while pooping.  · Watch your condition for any changes.  · Keep all follow-up visits as told by your doctor. This is important.  Contact a doctor if:  · You have pain that gets worse.  · You have a fever.  · You have not pooped for 4 days.  · You throw up (vomit).  · You are not hungry.  · You lose weight.  · You are bleeding from the anus.  · You have thin, pencil-like poop (stool).  Get help right away if:  · You have a fever, and your symptoms suddenly get worse.  · You leak poop or have blood in your poop.  · Your belly feels hard or bigger than normal (is bloated).  · You have very bad belly pain.  · You feel dizzy or you faint.  This information is not intended to replace advice given to you by your health care provider. Make sure you discuss any questions you have with your health care provider.  Document Released: 06/05/2009 Document Revised: 07/07/2017 Document Reviewed: 06/07/2017  GLSS Interactive Patient Education ©  2017 [employer]      Take metamucil or other fiber supplement daily, drink plenty of fluids, high fiber diet.

## 2018-03-21 NOTE — PROGRESS NOTES
"Sanjana Asher is a 62 y.o. female.   Chief Complaint   Patient presents with   • Cough     productive SX 2-3 weeks also having headaches       History of Present Illness   Patient is here with complaint of productive cough for past 2 weeks, she does have a history of sarcoidosis and asthma. She does see a pulmonologist and an allergist. States she did have some\"yellowish\" sputum.  Patient also has complaint of \"bowel\" issues, did have a large normal BM this morning after drinking lemon water for 2 days, but had been having just having small bowel movements for 2 weeks, also having RUQ discomfort, that improved after BM.  Also has complaint of \"buzzing sound\" in left year, that has been going on for \"several months\", is becoming bothersome.   The following portions of the patient's history were reviewed and updated as appropriate: allergies, current medications, past family history, past medical history, past social history, past surgical history and problem list.    Review of Systems   Constitutional: Negative for chills, fatigue and fever.   HENT: Positive for congestion, rhinorrhea and tinnitus (left).    Respiratory: Positive for cough and shortness of breath (occ).    Gastrointestinal: Positive for constipation. Negative for abdominal pain, blood in stool, diarrhea, nausea and vomiting.   Genitourinary: Negative for difficulty urinating and dysuria.   Neurological: Positive for dizziness (occ \"gets off balance\") and headaches. Negative for light-headedness.       Objective   Physical Exam   Constitutional: She is oriented to person, place, and time. She appears well-developed and well-nourished. No distress.   HENT:   Head: Normocephalic and atraumatic.   Right Ear: External ear normal. Tympanic membrane is not erythematous and not bulging.   Left Ear: External ear normal. Tympanic membrane is not erythematous and not bulging.   Mouth/Throat: Oropharynx is clear and moist. No oropharyngeal " exudate.   Moderate cerumen   Eyes: Conjunctivae and EOM are normal. Pupils are equal, round, and reactive to light.   Neck: Normal range of motion. Neck supple. No thyromegaly present.   Cardiovascular: Normal rate, regular rhythm and normal heart sounds.    No murmur heard.  Pulmonary/Chest: Effort normal and breath sounds normal. No respiratory distress. She has no wheezes.   Abdominal: Soft. Bowel sounds are normal. She exhibits no distension and no mass. There is tenderness (generalized lower abdomen to deep palpation). There is no rebound and no guarding. No hernia.   Musculoskeletal: She exhibits no edema or deformity.   Lymphadenopathy:     She has no cervical adenopathy.   Neurological: She is alert and oriented to person, place, and time.   Skin: Skin is warm and dry. She is not diaphoretic.   Psychiatric: She has a normal mood and affect. Her behavior is normal. Judgment and thought content normal.   Vitals reviewed.      Assessment/Plan   Kathy was seen today for cough.    Diagnoses and all orders for this visit:    Sarcoidosis  -     predniSONE (DELTASONE) 20 MG tablet; Take 1 tablet by mouth Daily.  -     azithromycin (ZITHROMAX) 250 MG tablet; Take 2 tablets the first day, then 1 tablet daily for 4 days.    Mild intermittent asthma without complication  -     predniSONE (DELTASONE) 20 MG tablet; Take 1 tablet by mouth Daily.  -     azithromycin (ZITHROMAX) 250 MG tablet; Take 2 tablets the first day, then 1 tablet daily for 4 days.    Tinnitus of left ear  -     Ambulatory Referral to ENT (Otolaryngology)      Prednisone and azithromycin prescribed to treat inflammation and possible infection due to history of sarcoidosis and asthma, patient advised to follow up with pulmonologist if symptoms do not improve.  Patient referred to ENT for evaluation of left ear tinnitus.  Patient was encouraged to keep me informed of any acute changes, lack of improvement, or any new concerning symptoms.Patient voiced  understanding of all instructions and denied further questions.

## 2018-06-06 ENCOUNTER — OFFICE VISIT (OUTPATIENT)
Dept: PULMONOLOGY | Facility: CLINIC | Age: 63
End: 2018-06-06

## 2018-06-06 VITALS
SYSTOLIC BLOOD PRESSURE: 120 MMHG | TEMPERATURE: 98.6 F | HEIGHT: 63 IN | BODY MASS INDEX: 32.96 KG/M2 | OXYGEN SATURATION: 98 % | DIASTOLIC BLOOD PRESSURE: 72 MMHG | WEIGHT: 186 LBS | HEART RATE: 84 BPM

## 2018-06-06 DIAGNOSIS — J45.20 MILD INTERMITTENT ASTHMA WITHOUT COMPLICATION: Primary | ICD-10-CM

## 2018-06-06 DIAGNOSIS — J30.89 CHRONIC NON-SEASONAL ALLERGIC RHINITIS, UNSPECIFIED TRIGGER: ICD-10-CM

## 2018-06-06 PROCEDURE — 99213 OFFICE O/P EST LOW 20 MIN: CPT | Performed by: NURSE PRACTITIONER

## 2018-06-06 RX ORDER — ALBUTEROL SULFATE 90 UG/1
2 AEROSOL, METERED RESPIRATORY (INHALATION) EVERY 4 HOURS PRN
Qty: 1 INHALER | Refills: 11 | Status: SHIPPED | OUTPATIENT
Start: 2018-06-06

## 2018-06-06 NOTE — PROGRESS NOTES
[facility] Pulmonary Follow up    CHIEF COMPLAINT    Asthma, allergic rhinitis    HISTORY OF PRESENT ILLNESS    Kathy Asher is a 62 y.o.female here today for follow-up on asthma and allergic rhinitis.    I last saw her in November.  She was treated for acute bronchitis in January.  She then had a productive cough for a few weeks in March and was eventually treated with azithromycin and prednisone.    She only uses pro-air for asthma.  She indicates that she has been out of this for about a month and has noticed an increase in her wheezing.  She is unsure if she has ever been on a maintenance inhaler before.  She does have a history of an elevated IgE as well as several positives on her allergen panel.  Xolair has been mentioned in the past as she had several exacerbations last year.    She's also had quite a bit of congestion.  She remains on Singulair, Allegra, Zyrtec D and Flonase.  She's been taking some Mucinex as well.  She is followed by Dr. Thomson for her allergies but is unsure about any upcoming appointments.    She complained of tinnitus when I last saw her.  She has since been referred to ENT.  She reports that she had her hearing evaluated and there were no abnormalities.  She did have quite a bit of cerumen removed from her ear and noticed a brief improvement and tinnitus but it has since returned, although not as severe.    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)   • Abnormal finding on lung imaging   • Asthma   • IBS (irritable bowel syndrome)   • Murmur   • Raised level of immunoglobulins   • Sarcoidosis   • Allergic rhinitis   • ANNABELLA II (cervical intraepithelial neoplasia II)   • ELA III (vulvar intraepithelial neoplasia III)       Allergies   Allergen Reactions   • Ciprofloxacin Other (See Comments)     Mouth felt funny, very dry   • Etodolac Other (See Comments)     Slurred speech        Current Outpatient  Prescriptions:   •  albuterol (PROAIR HFA) 108 (90 Base) MCG/ACT inhaler, Inhale 2 puffs Every 4 (Four) Hours As Needed for Wheezing., Disp: 1 inhaler, Rfl: 11  •  Ascorbic Acid (VITAMIN C) 500 MG capsule, Take  by mouth., Disp: , Rfl:   •  azelastine (ASTELIN) 0.1 % nasal spray, U 2 SPRAYS IEN BID PRF ALLERGIES, Disp: , Rfl: 11  •  Calcium-Magnesium-Vitamin D - MG-MG-UNIT tablet sustained-release 24 hour, Take 1 tablet by mouth Daily., Disp: , Rfl:   •  cetirizine (zyrTEC) 10 MG tablet, Take 10 mg by mouth Daily., Disp: , Rfl:   •  cetirizine-pseudoephedrine (ZYRTEC-D ALLERGY & CONGESTION) 5-120 MG per 12 hr tablet, Take  by mouth., Disp: , Rfl:   •  fluticasone (FLONASE) 50 MCG/ACT nasal spray, into each nostril 2 (two) times a day., Disp: , Rfl:   •  latanoprost (XALATAN) 0.005 % ophthalmic solution, Apply  to eye., Disp: , Rfl:   •  montelukast (SINGULAIR) 10 MG tablet, TK 1 T PO IN THE DEREK, Disp: , Rfl: 11  •  Multiple Vitamin (MULTI-VITAMIN) tablet, Take  by mouth., Disp: , Rfl:   •  Vitamin E 400 UNITS tablet, Take  by mouth., Disp: , Rfl:   •  azithromycin (ZITHROMAX) 250 MG tablet, Take 2 tablets the first day, then 1 tablet daily for 4 days., Disp: 6 tablet, Rfl: 0  •  brompheniramine-pseudoephedrine-DM 30-2-10 MG/5ML syrup, Take 5 mL by mouth 4 (Four) Times a Day As Needed for Cough., Disp: 120 mL, Rfl: 1  MEDICATION LIST AND ALLERGIES REVIEWED.    Social History   Substance Use Topics   • Smoking status: Never Smoker   • Smokeless tobacco: Never Used   • Alcohol use No       FAMILY AND SOCIAL HISTORY REVIEWED.    Review of Systems   Constitutional: Negative for chills, fatigue, fever and unexpected weight change.   HENT: Positive for rhinorrhea and tinnitus. Negative for congestion, nosebleeds, postnasal drip, sinus pressure and trouble swallowing.    Respiratory: Positive for cough and wheezing. Negative for chest tightness and shortness of breath.    Cardiovascular: Negative for chest pain  "and leg swelling.   Gastrointestinal: Negative for abdominal pain, constipation, diarrhea, nausea and vomiting.   Genitourinary: Negative for dysuria, frequency, hematuria and urgency.   Musculoskeletal: Negative for myalgias.   Neurological: Negative for dizziness, weakness, numbness and headaches.   All other systems reviewed and are negative.  .    /72 (BP Location: Right arm, Patient Position: Sitting, Cuff Size: Adult)   Pulse 84   Temp 98.6 °F (37 °C)   Ht 160 cm (63\")   Wt 84.4 kg (186 lb)   SpO2 98%   BMI 32.95 kg/m²     Immunization History   Administered Date(s) Administered   • Td 08/01/2007   • Tdap 08/03/2017       Physical Exam   Constitutional: She appears well-developed. No distress.   HENT:   Head: Normocephalic.   Right Ear: External ear normal. No drainage.   Left Ear: External ear normal. No drainage.   Neck: Neck supple.   Cardiovascular: Normal rate, regular rhythm and normal heart sounds.    No murmur heard.  Pulmonary/Chest: Effort normal and breath sounds normal. No stridor. No respiratory distress. She has no wheezes.   Musculoskeletal: Normal range of motion. She exhibits no edema.   Neurological: She is alert.   Skin: Skin is warm and dry.   Psychiatric: She has a normal mood and affect. Her behavior is normal.   Vitals reviewed.        RESULTS        PROBLEM LIST    Problem List Items Addressed This Visit        Respiratory    Asthma - Primary    Relevant Medications    albuterol (PROAIR HFA) 108 (90 Base) MCG/ACT inhaler    Allergic rhinitis            DISCUSSION    We discussed the possibility of adding on an ICS for her asthma.  She has noted quite a bit of wheezing but has been out of her pro-air for over a month.  She would like to resume pro-air use before considering adding on any additional inhalers.    She'll continue on her current regimen for allergic rhinitis.  I encouraged continued follow-up with Dr. Thomson.    Follow-up in 6 months or sooner if needed.    I " spent 15 minutes with the patient. I spent > 50% percent of this time counseling and discussing diagnosis, current status and treatment options.    SYLVESTER Snow  06/06/20181:23 PM  Electronically signed     Please note that portions of this note were completed with a voice recognition program. Efforts were made to edit the dictations, but occasionally words are mistranscribed.      CC: SYLVESTER Gilmore

## 2018-06-27 ENCOUNTER — LAB (OUTPATIENT)
Dept: LAB | Facility: HOSPITAL | Age: 63
End: 2018-06-27

## 2018-06-27 ENCOUNTER — OFFICE VISIT (OUTPATIENT)
Dept: FAMILY MEDICINE CLINIC | Facility: CLINIC | Age: 63
End: 2018-06-27

## 2018-06-27 VITALS
RESPIRATION RATE: 16 BRPM | OXYGEN SATURATION: 98 % | HEART RATE: 72 BPM | SYSTOLIC BLOOD PRESSURE: 140 MMHG | TEMPERATURE: 97.1 F | DIASTOLIC BLOOD PRESSURE: 100 MMHG | BODY MASS INDEX: 32.77 KG/M2 | WEIGHT: 185 LBS

## 2018-06-27 DIAGNOSIS — K59.00 CONSTIPATION, UNSPECIFIED CONSTIPATION TYPE: ICD-10-CM

## 2018-06-27 DIAGNOSIS — Z00.00 WELLNESS EXAMINATION: ICD-10-CM

## 2018-06-27 DIAGNOSIS — M25.532 BILATERAL WRIST PAIN: Primary | ICD-10-CM

## 2018-06-27 DIAGNOSIS — M54.2 NECK PAIN, CHRONIC: ICD-10-CM

## 2018-06-27 DIAGNOSIS — M25.531 BILATERAL WRIST PAIN: Primary | ICD-10-CM

## 2018-06-27 DIAGNOSIS — G89.29 NECK PAIN, CHRONIC: ICD-10-CM

## 2018-06-27 LAB
25(OH)D3 SERPL-MCNC: 26.9 NG/ML
ALBUMIN SERPL-MCNC: 4.11 G/DL (ref 3.2–4.8)
ALBUMIN/GLOB SERPL: 1.4 G/DL (ref 1.5–2.5)
ALP SERPL-CCNC: 46 U/L (ref 25–100)
ALT SERPL W P-5'-P-CCNC: 21 U/L (ref 7–40)
ANION GAP SERPL CALCULATED.3IONS-SCNC: 4 MMOL/L (ref 3–11)
ARTICHOKE IGE QN: 157 MG/DL (ref 0–130)
AST SERPL-CCNC: 22 U/L (ref 0–33)
BILIRUB SERPL-MCNC: 0.5 MG/DL (ref 0.3–1.2)
BUN BLD-MCNC: 17 MG/DL (ref 9–23)
BUN/CREAT SERPL: 18.3 (ref 7–25)
CALCIUM SPEC-SCNC: 9.2 MG/DL (ref 8.7–10.4)
CHLORIDE SERPL-SCNC: 104 MMOL/L (ref 99–109)
CHOLEST SERPL-MCNC: 248 MG/DL (ref 0–200)
CO2 SERPL-SCNC: 32 MMOL/L (ref 20–31)
CREAT BLD-MCNC: 0.93 MG/DL (ref 0.6–1.3)
GFR SERPL CREATININE-BSD FRML MDRD: 74 ML/MIN/1.73
GLOBULIN UR ELPH-MCNC: 2.9 GM/DL
GLUCOSE BLD-MCNC: 87 MG/DL (ref 70–100)
HCV AB SER DONR QL: NORMAL
HDLC SERPL-MCNC: 71 MG/DL (ref 40–60)
POTASSIUM BLD-SCNC: 4.5 MMOL/L (ref 3.5–5.5)
PROT SERPL-MCNC: 7 G/DL (ref 5.7–8.2)
SODIUM BLD-SCNC: 140 MMOL/L (ref 132–146)
TRIGL SERPL-MCNC: 91 MG/DL (ref 0–150)

## 2018-06-27 PROCEDURE — 82306 VITAMIN D 25 HYDROXY: CPT

## 2018-06-27 PROCEDURE — 80061 LIPID PANEL: CPT

## 2018-06-27 PROCEDURE — 36415 COLL VENOUS BLD VENIPUNCTURE: CPT

## 2018-06-27 PROCEDURE — 80053 COMPREHEN METABOLIC PANEL: CPT

## 2018-06-27 PROCEDURE — 86803 HEPATITIS C AB TEST: CPT

## 2018-06-27 PROCEDURE — 99214 OFFICE O/P EST MOD 30 MIN: CPT | Performed by: NURSE PRACTITIONER

## 2018-06-27 RX ORDER — MELOXICAM 7.5 MG/1
7.5 TABLET ORAL DAILY
Qty: 15 TABLET | Refills: 0 | Status: SHIPPED | OUTPATIENT
Start: 2018-06-27 | End: 2018-12-21

## 2018-06-27 NOTE — PATIENT INSTRUCTIONS
Constipation, Adult  Constipation is when a person:  · Poops (has a bowel movement) fewer times in a week than normal.  · Has a hard time pooping.  · Has poop that is dry, hard, or bigger than normal.    Follow these instructions at home:  Eating and drinking    · Eat foods that have a lot of fiber, such as:  ? Fresh fruits and vegetables.  ? Whole grains.  ? Beans.  · Eat less of foods that are high in fat, low in fiber, or overly processed, such as:  ? French fries.  ? Hamburgers.  ? Cookies.  ? Candy.  ? Soda.  · Drink enough fluid to keep your pee (urine) clear or pale yellow.  General instructions  · Exercise regularly or as told by your doctor.  · Go to the restroom when you feel like you need to poop. Do not hold it in.  · Take over-the-counter and prescription medicines only as told by your doctor. These include any fiber supplements.  · Do pelvic floor retraining exercises, such as:  ? Doing deep breathing while relaxing your lower belly (abdomen).  ? Relaxing your pelvic floor while pooping.  · Watch your condition for any changes.  · Keep all follow-up visits as told by your doctor. This is important.  Contact a doctor if:  · You have pain that gets worse.  · You have a fever.  · You have not pooped for 4 days.  · You throw up (vomit).  · You are not hungry.  · You lose weight.  · You are bleeding from the anus.  · You have thin, pencil-like poop (stool).  Get help right away if:  · You have a fever, and your symptoms suddenly get worse.  · You leak poop or have blood in your poop.  · Your belly feels hard or bigger than normal (is bloated).  · You have very bad belly pain.  · You feel dizzy or you faint.  This information is not intended to replace advice given to you by your health care provider. Make sure you discuss any questions you have with your health care provider.  Document Released: 06/05/2009 Document Revised: 07/07/2017 Document Reviewed: 06/07/2017  [employer] Interactive Patient Education ©  2018 [employer]  De Quervain Disease  De Quervain disease is inflammation of the tendon on the thumb side of the wrist. Tendons are cords of tissue that connect bones to muscles. The tendons in your hand pass through a tunnel, or sheath. A slippery layer of tissue (synovium) lets the tendons move smoothly in the sheath. With de Quervain disease, the sheath swells or thickens, causing friction and pain.  The condition is also called de Quervain tendinosis and de Quervain syndrome. It occurs most often in women who are 30-50 years old.  What are the causes?  The exact cause of de Quervain disease is not known. It may result from:  · Overusing your hands, especially with repetitive motions that involve twisting your hand or using a forceful .  · Pregnancy.  · Rheumatoid disease.    What increases the risk?  You may have a greater risk for de Quervain disease if you:  · Are a middle-aged woman.  · Are pregnant.  · Have rheumatoid arthritis.  · Have diabetes.  · Use your hands far more than normal, especially with a tight  or excessive twisting.    What are the signs or symptoms?  Pain on the thumb side of your wrist is the main symptom of de Quervain disease. Other signs and symptoms include:  · Pain that gets worse when you grasp something or turn your wrist.  · Pain that extends up the forearm.  · Cysts in the area of the pain.  · Swelling of your wrist and hand.  · A sensation of snapping in the wrist.  · Trouble moving the thumb and wrist.    How is this diagnosed?  Your health care provider may diagnose de Quervain disease based on your signs and symptoms. A physical exam will also be done. A simple test (Finkelstein test) that involves pulling your thumb and wrist to see if this causes pain can help determine whether you have the condition. Sometimes you may need to have an X-ray.  How is this treated?  Avoiding any activity that causes pain and swelling is the best treatment. Other options  "include:  · Wearing a splint.  · Taking medicine. Anti-inflammatory medicines and corticosteroid injections may reduce inflammation and relieve pain.  · Having surgery if other treatments do not work.    Follow these instructions at home:  · Using ice can be helpful after doing activities that involve the sore wrist. To apply ice to the injured area:  ? Put ice in a plastic bag.  ? Place a towel between your skin and the bag.  ? Leave the ice on for 20 minutes, 2-3 times a day.  · Take medicines only as directed by your health care provider.  · Wear your splint as directed. This will allow your hand to rest and heal.  Contact a health care provider if:  · Your pain medicine does not help.  · Your pain gets worse.  · You develop new symptoms.  This information is not intended to replace advice given to you by your health care provider. Make sure you discuss any questions you have with your health care provider.  Document Released: 09/12/2002 Document Revised: 05/25/2017 Document Reviewed: 04/22/2015  [employer] Patient Education © 2018 [employer]    Osteoarthritis  Osteoarthritis is a type of arthritis that affects tissue that covers the ends of bones in joints (cartilage). Cartilage acts as a cushion between the bones and helps them move smoothly. Osteoarthritis results when cartilage in the joints gets worn down. Osteoarthritis is sometimes called \"wear and tear\" arthritis.  Osteoarthritis is the most common form of arthritis. It often occurs in older people. It is a condition that gets worse over time (a progressive condition). Joints that are most often affected by this condition are in:  · Fingers.  · Toes.  · Hips.  · Knees.  · Spine, including neck and lower back.    What are the causes?  This condition is caused by age-related wearing down of cartilage that covers the ends of bones.  What increases the risk?  The following factors may make you more likely to develop this condition:  · Older " age.  · Being overweight or obese.  · Overuse of joints, such as in athletes.  · Past injury of a joint.  · Past surgery on a joint.  · Family history of osteoarthritis.    What are the signs or symptoms?  The main symptoms of this condition are pain, swelling, and stiffness in the joint. The joint may lose its shape over time. Small pieces of bone or cartilage may break off and float inside of the joint, which may cause more pain and damage to the joint. Small deposits of bone (osteophytes) may grow on the edges of the joint. Other symptoms may include:  · A grating or scraping feeling inside the joint when you move it.  · Popping or creaking sounds when you move.    Symptoms may affect one or more joints. Osteoarthritis in a major joint, such as your knee or hip, can make it painful to walk or exercise. If you have osteoarthritis in your hands, you might not be able to  items, twist your hand, or control small movements of your hands and fingers (fine motor skills).  How is this diagnosed?  This condition may be diagnosed based on:  · Your medical history.  · A physical exam.  · Your symptoms.  · X-rays of the affected joint(s).  · Blood tests to rule out other types of arthritis.    How is this treated?  There is no cure for this condition, but treatment can help to control pain and improve joint function. Treatment plans may include:  · A prescribed exercise program that allows for rest and joint relief. You may work with a physical therapist.  · A weight control plan.  · Pain relief techniques, such as:  ? Applying heat and cold to the joint.  ? Electric pulses delivered to nerve endings under the skin (transcutaneous electrical nerve stimulation, or TENS).  ? Massage.  ? Certain nutritional supplements.  · NSAIDs or prescription medicines to help relieve pain.  · Medicine to help relieve pain and inflammation (corticosteroids). This can be given by mouth (orally) or as an injection.  · Assistive devices,  such as a brace, wrap, splint, specialized glove, or cane.  · Surgery, such as:  ? An osteotomy. This is done to reposition the bones and relieve pain or to remove loose pieces of bone and cartilage.  ? Joint replacement surgery. You may need this surgery if you have very bad (advanced) osteoarthritis.    Follow these instructions at home:  Activity  · Rest your affected joints as directed by your health care provider.  · Do not drive or use heavy machinery while taking prescription pain medicine.  · Exercise as directed. Your health care provider or physical therapist may recommend specific types of exercise, such as:  ? Strengthening exercises. These are done to strengthen the muscles that support joints that are affected by arthritis. They can be performed with weights or with exercise bands to add resistance.  ? Aerobic activities. These are exercises, such as brisk walking or water aerobics, that get your heart pumping.  ? Range-of-motion activities. These keep your joints easy to move.  ? Balance and agility exercises.  Managing pain, stiffness, and swelling  · If directed, apply heat to the affected area as often as told by your health care provider. Use the heat source that your health care provider recommends, such as a moist heat pack or a heating pad.  ? If you have a removable assistive device, remove it as told by your health care provider.  ? Place a towel between your skin and the heat source. If your health care provider tells you to keep the assistive device on while you apply heat, place a towel between the assistive device and the heat source.  ? Leave the heat on for 20-30 minutes.  ? Remove the heat if your skin turns bright red. This is especially important if you are unable to feel pain, heat, or cold. You may have a greater risk of getting burned.  · If directed, put ice on the affected joint:  ? If you have a removable assistive device, remove it as told by your health care provider.  ? Put  ice in a plastic bag.  ? Place a towel between your skin and the bag. If your health care provider tells you to keep the assistive device on during icing, place a towel between the assistive device and the bag.  ? Leave the ice on for 20 minutes, 2-3 times a day.  General instructions  · Take over-the-counter and prescription medicines only as told by your health care provider.  · Maintain a healthy weight. Follow instructions from your health care provider for weight control. These may include dietary restrictions.  · Do not use any products that contain nicotine or tobacco, such as cigarettes and e-cigarettes. These can delay bone healing. If you need help quitting, ask your health care provider.  · Use assistive devices as directed by your health care provider.  · Keep all follow-up visits as told by your health care provider. This is important.  Where to find more information:  · National [place] of Arthritis and Musculoskeletal and Skin Diseases: www.niams.nih.gov  · National [place] on Aging: www.nona.nih.gov  · American College of Rheumatology: www.rheumatology.org  Contact a health care provider if:  · Your skin turns red.  · You develop a rash.  · You have pain that gets worse.  · You have a fever along with joint or muscle aches.  Get help right away if:  · You lose a lot of weight.  · You suddenly lose your appetite.  · You have night sweats.  Summary  · Osteoarthritis is a type of arthritis that affects tissue covering the ends of bones in joints (cartilage).  · This condition is caused by age-related wearing down of cartilage that covers the ends of bones.  · The main symptom of this condition is pain, swelling, and stiffness in the joint.  · There is no cure for this condition, but treatment can help to control pain and improve joint function.  This information is not intended to replace advice given to you by your health care provider. Make sure you discuss any questions you have with your health  care provider.  Document Released: 12/18/2006 Document Revised: 08/21/2017 Document Reviewed: 08/21/2017  [employer] Interactive Patient Education © 2018 [employer]      Start daily fiber supplement, drink plenty of water  Wear wrist brace daily, may apply ice to site of pain  Follow up if no improvement   Do not take Aleve, Motrin, or ibuprofen with the Meloxicam, OK to take tylenol

## 2018-06-27 NOTE — PROGRESS NOTES
"Sanjana Asher is a 62 y.o. female.   Chief Complaint   Patient presents with   • Pain       History of Present Illness   Patient is here with complaint of generalized abdominal pain x 1 month, off and on; eating does not make it worse, crackers and cottage cheese seem to make it better, does not have a bowel movement everyday, has to strain at times.  Also has complaint of wrist and thumb pain bilaterally, x 2-3 months. Wearing a brace helps. Has tried Advil and Aleve, gets temporary relief.   Also has cervical spine pain \"every now and then\", but has been going on for a \"long time\". Has generalized joint pain, knees, back.  The following portions of the patient's history were reviewed and updated as appropriate: allergies, current medications, past family history, past medical history, past social history, past surgical history and problem list.    Review of Systems   HENT: Negative for sore throat, trouble swallowing and voice change.    Respiratory: Negative for shortness of breath and wheezing.    Cardiovascular: Negative for chest pain and palpitations.   Gastrointestinal: Positive for constipation. Negative for abdominal distention, abdominal pain, blood in stool, diarrhea, nausea and vomiting.   Genitourinary: Negative for difficulty urinating and dysuria.   Musculoskeletal: Positive for arthralgias, myalgias and neck pain. Negative for neck stiffness.   Skin: Negative for rash and wound.   Neurological: Negative for dizziness, light-headedness and headaches.   Hematological: Negative for adenopathy.       Objective   Physical Exam   Constitutional: She is oriented to person, place, and time. She appears well-nourished. No distress.   HENT:   Head: Normocephalic and atraumatic.   Right Ear: External ear normal.   Left Ear: External ear normal.   Eyes: Conjunctivae are normal. No scleral icterus.   Cardiovascular: Normal rate, regular rhythm and normal heart sounds.    No murmur " heard.  Pulmonary/Chest: Effort normal and breath sounds normal. No respiratory distress. She has no wheezes.   Abdominal: Soft. Bowel sounds are normal. She exhibits no distension and no mass. There is no tenderness. There is no rebound and no guarding.   Musculoskeletal: She exhibits tenderness (left thumb and wrist and cervical spine).   Neurological: She is alert and oriented to person, place, and time.   Skin: Skin is warm and dry. She is not diaphoretic.   Psychiatric: She has a normal mood and affect. Her behavior is normal. Thought content normal.   Vitals reviewed.      Assessment/Plan   Kathy was seen today for pain.    Diagnoses and all orders for this visit:    Bilateral wrist pain  -     meloxicam (MOBIC) 7.5 MG tablet; Take 1 tablet by mouth Daily.    Neck pain, chronic  -     meloxicam (MOBIC) 7.5 MG tablet; Take 1 tablet by mouth Daily.    Constipation, unspecified constipation type      Possible tendonitis in wrists from overuse, advised daily wrist brace use, may apply ice to area to reduce inflammation and pain, meloxicam prescribed, advised patient not to take other NSAIDS with this, may take tylenol  Generalized joint pain seems to be arthritis related, if meloxicam does not help, patient advised to follow up with her PCP, Larry Yu.  Advised a daily fiber supplement with plenty of water for constipation that may be causing the intermittent abdominal pain.  Patient was encouraged to keep me informed of any acute changes, lack of improvement, or any new concerning symptoms.Patient voiced understanding of all instructions and denied further questions.

## 2018-07-12 ENCOUNTER — TELEPHONE (OUTPATIENT)
Dept: FAMILY MEDICINE CLINIC | Facility: CLINIC | Age: 63
End: 2018-07-12

## 2018-07-12 NOTE — TELEPHONE ENCOUNTER
Pt states she never got a call about her lab results from 6.27.18.  I gave patient resutls but please mail her papers on lowering cholesterol.    thanks

## 2018-07-26 ENCOUNTER — TELEPHONE (OUTPATIENT)
Dept: FAMILY MEDICINE CLINIC | Facility: CLINIC | Age: 63
End: 2018-07-26

## 2018-07-26 NOTE — TELEPHONE ENCOUNTER
PT CALLED AND SAID SHE CALLED ABOUT A WEEK AGO ABOUT HER LAB RESULTS AND WAS SUPPOSED TO GET SOMETHING IN THE MAIL BUT HASNT YET. PLEASE SEND AGAIN OR CALL PT TO LET HER KNOW SOMETHING.    CALL [phone]

## 2018-09-07 ENCOUNTER — LAB REQUISITION (OUTPATIENT)
Dept: LAB | Facility: HOSPITAL | Age: 63
End: 2018-09-07

## 2018-09-07 DIAGNOSIS — K92.1 MELENA: ICD-10-CM

## 2018-09-07 PROCEDURE — 88305 TISSUE EXAM BY PATHOLOGIST: CPT | Performed by: INTERNAL MEDICINE

## 2018-09-17 LAB
CYTO UR: NORMAL
LAB AP CASE REPORT: NORMAL
LAB AP CLINICAL INFORMATION: NORMAL
PATH REPORT.FINAL DX SPEC: NORMAL
PATH REPORT.GROSS SPEC: NORMAL

## 2018-12-21 ENCOUNTER — OFFICE VISIT (OUTPATIENT)
Dept: PULMONOLOGY | Facility: CLINIC | Age: 63
End: 2018-12-21

## 2018-12-21 VITALS
SYSTOLIC BLOOD PRESSURE: 136 MMHG | BODY MASS INDEX: 32.6 KG/M2 | DIASTOLIC BLOOD PRESSURE: 88 MMHG | TEMPERATURE: 97.5 F | HEART RATE: 81 BPM | OXYGEN SATURATION: 98 % | WEIGHT: 184 LBS | HEIGHT: 63 IN

## 2018-12-21 DIAGNOSIS — J30.89 NON-SEASONAL ALLERGIC RHINITIS, UNSPECIFIED TRIGGER: Primary | ICD-10-CM

## 2018-12-21 DIAGNOSIS — J45.20 MILD INTERMITTENT ASTHMA WITHOUT COMPLICATION: ICD-10-CM

## 2018-12-21 PROCEDURE — 99213 OFFICE O/P EST LOW 20 MIN: CPT | Performed by: NURSE PRACTITIONER

## 2018-12-21 NOTE — PROGRESS NOTES
[facility] Pulmonary Follow up    CHIEF COMPLAINT    Asthma/allergic rhinitis    HISTORY OF PRESENT ILLNESS    Kathy Asher is a 63 y.o.female here today for follow-up of her asthma and allergic rhinitis.  She was last seen in June 2018.  She has not been on any antibiotics or steroids at this time.  She states she was congested a couple weeks ago however did not have to take any antibiotics at that time.    She continues to use pro-air for her asthma.  She uses this maybe 2 times per week.  She was referred to an ENT since her last visit and they told her that she had congestion due to allergies and was referred to Dr. Thomson.  She remains on Singulair, Allegra, Zyrtec-D and Flonase.  She was recommended to be placed on allergy injections however she refused this and may recommend her staying on this medical regimen.    She denies fever, chills, sputum production, hemoptysis, night sweats, chest pain or palpitations.  She states that her breathing is at baseline.  She only has shortness of breath when she is more congested.  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)   • Abnormal finding on lung imaging   • Asthma   • IBS (irritable bowel syndrome)   • Murmur   • Raised level of immunoglobulins   • Sarcoidosis   • Allergic rhinitis   • ANNABELLA II (cervical intraepithelial neoplasia II)   • ELA III (vulvar intraepithelial neoplasia III)       Allergies   Allergen Reactions   • Ciprofloxacin Other (See Comments)     Mouth felt funny, very dry   • Etodolac Other (See Comments)     Slurred speech        Current Outpatient Medications:   •  albuterol (PROAIR HFA) 108 (90 Base) MCG/ACT inhaler, Inhale 2 puffs Every 4 (Four) Hours As Needed for Wheezing., Disp: 1 inhaler, Rfl: 11  •  Ascorbic Acid (VITAMIN C) 500 MG capsule, Take  by mouth., Disp: , Rfl:   •  azelastine (ASTELIN) 0.1 % nasal spray, U 2 SPRAYS IEN BID PRF ALLERGIES, Disp: ,  Rfl: 11  •  Calcium-Magnesium-Vitamin D - MG-MG-UNIT tablet sustained-release 24 hour, Take 1 tablet by mouth Daily., Disp: , Rfl:   •  cetirizine (zyrTEC) 10 MG tablet, Take 10 mg by mouth Daily., Disp: , Rfl:   •  cetirizine-pseudoephedrine (ZYRTEC-D ALLERGY & CONGESTION) 5-120 MG per 12 hr tablet, Take  by mouth., Disp: , Rfl:   •  fluticasone (FLONASE) 50 MCG/ACT nasal spray, into each nostril 2 (two) times a day., Disp: , Rfl:   •  latanoprost (XALATAN) 0.005 % ophthalmic solution, Apply  to eye., Disp: , Rfl:   •  montelukast (SINGULAIR) 10 MG tablet, TK 1 T PO IN THE DEREK, Disp: , Rfl: 11  •  Multiple Vitamin (MULTI-VITAMIN) tablet, Take  by mouth., Disp: , Rfl:   •  Vitamin E 400 UNITS tablet, Take  by mouth., Disp: , Rfl:   MEDICATION LIST AND ALLERGIES REVIEWED.    Social History     Tobacco Use   • Smoking status: Never Smoker   • Smokeless tobacco: Never Used   Substance Use Topics   • Alcohol use: No   • Drug use: No       FAMILY AND SOCIAL HISTORY REVIEWED.    Review of Systems   Constitutional: Negative for activity change, appetite change, fatigue, fever and unexpected weight change.   HENT: Negative for congestion, postnasal drip, rhinorrhea, sinus pressure, sore throat and voice change.    Eyes: Negative for visual disturbance.   Respiratory: Negative for cough, chest tightness, shortness of breath and wheezing.    Cardiovascular: Negative for chest pain, palpitations and leg swelling.   Gastrointestinal: Negative for abdominal distention, abdominal pain, nausea and vomiting.   Endocrine: Negative for cold intolerance and heat intolerance.   Genitourinary: Negative for difficulty urinating and urgency.   Musculoskeletal: Negative for arthralgias, back pain and neck pain.   Skin: Negative for color change and pallor.   Allergic/Immunologic: Negative for environmental allergies and food allergies.   Neurological: Negative for dizziness, syncope, weakness and light-headedness.  "  Hematological: Negative for adenopathy. Does not bruise/bleed easily.   Psychiatric/Behavioral: Negative for agitation and behavioral problems.   .    /88   Pulse 81   Temp 97.5 °F (36.4 °C)   Ht 160 cm (63\")   Wt 83.5 kg (184 lb)   SpO2 98% Comment: resting, room air  BMI 32.59 kg/m²     Immunization History   Administered Date(s) Administered   • Td 08/01/2007   • Tdap 08/03/2017       Physical Exam   Constitutional: She is oriented to person, place, and time. She appears well-developed and well-nourished.   HENT:   Head: Normocephalic and atraumatic.   Eyes: Pupils are equal, round, and reactive to light.   Neck: Normal range of motion. Neck supple. No thyromegaly present.   Cardiovascular: Normal rate, regular rhythm, normal heart sounds and intact distal pulses. Exam reveals no gallop and no friction rub.   No murmur heard.  Pulmonary/Chest: Effort normal and breath sounds normal. No respiratory distress. She has no wheezes. She has no rales. She exhibits no tenderness.   Abdominal: Soft. Bowel sounds are normal. There is no tenderness.   Musculoskeletal: Normal range of motion.   Lymphadenopathy:     She has no cervical adenopathy.   Neurological: She is alert and oriented to person, place, and time.   Skin: Skin is warm and dry. Capillary refill takes less than 2 seconds. She is not diaphoretic.   Psychiatric: She has a normal mood and affect. Her behavior is normal.   Nursing note and vitals reviewed.        RESULTS    PROBLEM LIST    Problem List Items Addressed This Visit        Respiratory    Asthma    Allergic rhinitis - Primary            DISCUSSION    Miss Asher was here for a follow-up of her asthma and allergic rhinitis.  Her asthma continues to be controlled as long as her allergic rhinitis is controlled.  She will continue her current medical regimen per her allergist.    She will continue her pro-air as needed and has many refills on this currently.    She will follow-up in 6 months " or sooner if her symptoms worsen.  We will obtain PFTs at his next visit since she's not had any in the last 2 years.  I spent 15 minutes with the patient. I spent > 50% percent of this time counseling and discussing diagnosis, prognosis, diagnostic testing, evaluation, current status, treatment options and management.    Shagufta Estrada, SYLVESTER  12/21/20183:19 PM  Electronically signed     Please note that portions of this note were completed with a voice recognition program. Efforts were made to edit the dictations, but occasionally words are mistranscribed.      CC: Larry Yu, APRN

## 2019-01-25 ENCOUNTER — OFFICE VISIT (OUTPATIENT)
Dept: FAMILY MEDICINE CLINIC | Facility: CLINIC | Age: 64
End: 2019-01-25

## 2019-01-25 ENCOUNTER — TELEPHONE (OUTPATIENT)
Dept: PULMONOLOGY | Facility: CLINIC | Age: 64
End: 2019-01-25

## 2019-01-25 VITALS
HEART RATE: 82 BPM | TEMPERATURE: 96.6 F | DIASTOLIC BLOOD PRESSURE: 92 MMHG | OXYGEN SATURATION: 99 % | WEIGHT: 190.4 LBS | SYSTOLIC BLOOD PRESSURE: 138 MMHG | BODY MASS INDEX: 33.73 KG/M2

## 2019-01-25 DIAGNOSIS — R68.89 FLU-LIKE SYMPTOMS: Primary | ICD-10-CM

## 2019-01-25 DIAGNOSIS — R05.9 COUGH: ICD-10-CM

## 2019-01-25 DIAGNOSIS — J01.40 ACUTE NON-RECURRENT PANSINUSITIS: ICD-10-CM

## 2019-01-25 LAB
EXPIRATION DATE: NORMAL
FLUAV AG NPH QL: NEGATIVE
FLUBV AG NPH QL: NEGATIVE
INTERNAL CONTROL: NORMAL
Lab: NORMAL

## 2019-01-25 PROCEDURE — 87804 INFLUENZA ASSAY W/OPTIC: CPT | Performed by: NURSE PRACTITIONER

## 2019-01-25 PROCEDURE — 99213 OFFICE O/P EST LOW 20 MIN: CPT | Performed by: NURSE PRACTITIONER

## 2019-01-25 RX ORDER — GUAIFENESIN AND CODEINE PHOSPHATE 100; 10 MG/5ML; MG/5ML
5 SOLUTION ORAL 3 TIMES DAILY PRN
Qty: 118 ML | Refills: 0 | Status: SHIPPED | OUTPATIENT
Start: 2019-01-25 | End: 2019-07-02

## 2019-01-25 RX ORDER — AMOXICILLIN AND CLAVULANATE POTASSIUM 875; 125 MG/1; MG/1
1 TABLET, FILM COATED ORAL EVERY 12 HOURS SCHEDULED
Qty: 20 TABLET | Refills: 0 | Status: SHIPPED | OUTPATIENT
Start: 2019-01-25 | End: 2019-02-04

## 2019-01-25 NOTE — PATIENT INSTRUCTIONS
"Upper Respiratory Infection, Adult  Most upper respiratory infections (URIs) are caused by a virus. A URI affects the nose, throat, and upper air passages. The most common type of URI is often called \"the common cold.\"  Follow these instructions at home:  · Take medicines only as told by your doctor.  · Gargle warm saltwater or take cough drops to comfort your throat as told by your doctor.  · Use a warm mist humidifier or inhale steam from a shower to increase air moisture. This may make it easier to breathe.  · Drink enough fluid to keep your pee (urine) clear or pale yellow.  · Eat soups and other clear broths.  · Have a healthy diet.  · Rest as needed.  · Go back to work when your fever is gone or your doctor says it is okay.  ? You may need to stay home longer to avoid giving your URI to others.  ? You can also wear a face mask and wash your hands often to prevent spread of the virus.  · Use your inhaler more if you have asthma.  · Do not use any tobacco products, including cigarettes, chewing tobacco, or electronic cigarettes. If you need help quitting, ask your doctor.  Contact a doctor if:  · You are getting worse, not better.  · Your symptoms are not helped by medicine.  · You have chills.  · You are getting more short of breath.  · You have brown or red mucus.  · You have yellow or brown discharge from your nose.  · You have pain in your face, especially when you bend forward.  · You have a fever.  · You have puffy (swollen) neck glands.  · You have pain while swallowing.  · You have white areas in the back of your throat.  Get help right away if:  · You have very bad or constant:  ? Headache.  ? Ear pain.  ? Pain in your forehead, behind your eyes, and over your cheekbones (sinus pain).  ? Chest pain.  · You have long-lasting (chronic) lung disease and any of the following:  ? Wheezing.  ? Long-lasting cough.  ? Coughing up blood.  ? A change in your usual mucus.  · You have a stiff neck.  · You have " changes in your:  ? Vision.  ? Hearing.  ? Thinking.  ? Mood.  This information is not intended to replace advice given to you by your health care provider. Make sure you discuss any questions you have with your health care provider.  Document Released: 06/05/2009 Document Revised: 08/20/2017 Document Reviewed: 03/25/2015  [employer] Interactive Patient Education © 2018 [employer]    Sinus Rinse  What is a sinus rinse?  A sinus rinse is a home treatment. It rinses your sinuses with a mixture of salt and water (saline solution). Sinuses are air-filled spaces in your skull behind the bones of your face and forehead. They open into your nasal cavity.  To do a sinus rinse, you will need:  · Saline solution.  · Neti pot or spray bottle. This releases the saline solution into your nose and through your sinuses. You can buy neti pots and spray bottles at:  ? Your local pharmacy.  ? A [employer] food store.  ? Online.    When should I do a sinus rinse?  A sinus rinse can help to clear your nasal cavity. It can clear:  · Mucus.  · Dirt.  · Dust.  · Pollen.    You may do a sinus rinse when you have:  · A cold.  · A virus.  · Allergies.  · A sinus infection.  · A stuffy nose.    If you are considering a sinus rinse:  · Ask your child's doctor before doing a sinus rinse on your child.  · Do not do a sinus rinse if you have had:  ? Ear or nasal surgery.  ? An ear infection.  ? Blocked ears.    How do I do a sinus rinse?  · Wash your hands.  · Disinfect your device using the directions that came with the device.  · Dry your device.  · Use the solution that comes with your device or one that is sold separately in stores. Follow the mixing directions on the package.  · Fill your device with the amount of saline solution as stated in the device instructions.  · Stand over a sink and tilt your head sideways over the sink.  · Place the spout of the device in your upper nostril (the one closer to the ceiling).  · Gently pour or squeeze  the saline solution into the nasal cavity. The liquid should drain to the lower nostril if you are not too congested.  · Gently blow your nose. Blowing too hard may cause ear pain.  · Repeat in the other nostril.  · Clean and rinse your device with clean water.  · Air-dry your device.  Are there risks of a sinus rinse?  Sinus rinse is normally very safe and helpful. However, there are a few risks, which include:  · A burning feeling in the sinuses. This may happen if you do not make the saline solution as instructed. Make sure to follow all directions when making the saline solution.  · Infection from unclean water. This is rare, but possible.  · Nasal irritation.    This information is not intended to replace advice given to you by your health care provider. Make sure you discuss any questions you have with your health care provider.  Document Released: 07/15/2015 Document Revised: 11/14/2017 Document Reviewed: 05/05/2015  [employer] Interactive Patient Education © 2017 [employer]    Sinusitis, Adult  Sinusitis is soreness and inflammation of your sinuses. Sinuses are hollow spaces in the bones around your face. They are located:  · Around your eyes.  · In the middle of your forehead.  · Behind your nose.  · In your cheekbones.    Your sinuses and nasal passages are lined with a stringy fluid (mucus). Mucus normally drains out of your sinuses. When your nasal tissues get inflamed or swollen, the mucus can get trapped or blocked so air cannot flow through your sinuses. This lets bacteria, viruses, and funguses grow, and that leads to infection.  Follow these instructions at home:  Medicines  · Take, use, or apply over-the-counter and prescription medicines only as told by your doctor. These may include nasal sprays.  · If you were prescribed an antibiotic medicine, take it as told by your doctor. Do not stop taking the antibiotic even if you start to feel better.  Hydrate and Humidify  · Drink enough water to keep  your pee (urine) clear or pale yellow.  · Use a cool mist humidifier to keep the humidity level in your home above 50%.  · Breathe in steam for 10-15 minutes, 3-4 times a day or as told by your doctor. You can do this in the bathroom while a hot shower is running.  · Try not to spend time in cool or dry air.  Rest  · Rest as much as possible.  · Sleep with your head raised (elevated).  · Make sure to get enough sleep each night.  General instructions  · Put a warm, moist washcloth on your face 3-4 times a day or as told by your doctor. This will help with discomfort.  · Wash your hands often with soap and water. If there is no soap and water, use hand .  · Do not smoke. Avoid being around people who are smoking (secondhand smoke).  · Keep all follow-up visits as told by your doctor. This is important.  Contact a doctor if:  · You have a fever.  · Your symptoms get worse.  · Your symptoms do not get better within 10 days.  Get help right away if:  · You have a very bad headache.  · You cannot stop throwing up (vomiting).  · You have pain or swelling around your face or eyes.  · You have trouble seeing.  · You feel confused.  · Your neck is stiff.  · You have trouble breathing.  This information is not intended to replace advice given to you by your health care provider. Make sure you discuss any questions you have with your health care provider.  Document Released: 06/05/2009 Document Revised: 08/13/2017 Document Reviewed: 10/12/2016  [employer] Interactive Patient Education © 2018 [employer]    Cough, Adult  A cough helps to clear your throat and lungs. A cough may last only 2-3 weeks (acute), or it may last longer than 8 weeks (chronic). Many different things can cause a cough. A cough may be a sign of an illness or another medical condition.  Follow these instructions at home:  · Pay attention to any changes in your cough.  · Take medicines only as told by your doctor.  ? If you were prescribed an  antibiotic medicine, take it as told by your doctor. Do not stop taking it even if you start to feel better.  ? Talk with your doctor before you try using a cough medicine.  · Drink enough fluid to keep your pee (urine) clear or pale yellow.  · If the air is dry, use a cold steam vaporizer or humidifier in your home.  · Stay away from things that make you cough at work or at home.  · If your cough is worse at night, try using extra pillows to raise your head up higher while you sleep.  · Do not smoke, and try not to be around smoke. If you need help quitting, ask your doctor.  · Do not have caffeine.  · Do not drink alcohol.  · Rest as needed.  Contact a doctor if:  · You have new problems (symptoms).  · You cough up yellow fluid (pus).  · Your cough does not get better after 2-3 weeks, or your cough gets worse.  · Medicine does not help your cough and you are not sleeping well.  · You have pain that gets worse or pain that is not helped with medicine.  · You have a fever.  · You are losing weight and you do not know why.  · You have night sweats.  Get help right away if:  · You cough up blood.  · You have trouble breathing.  · Your heartbeat is very fast.  This information is not intended to replace advice given to you by your health care provider. Make sure you discuss any questions you have with your health care provider.  Document Released: 08/30/2012 Document Revised: 05/25/2017 Document Reviewed: 02/24/2016  [employer] Interactive Patient Education © 2018 [employer]    Sore Throat  When you have a sore throat, your throat may:  · Hurt.  · Burn.  · Feel irritated.  · Feel scratchy.    Many things can cause a sore throat, including:  · An infection.  · Allergies.  · Dryness in the air.  · Smoke or pollution.  · Gastroesophageal reflux disease (GERD).  · A tumor.    A sore throat can be the first sign of another sickness. It can happen with other problems, like coughing or a fever. Most sore throats go away  without treatment.  Follow these instructions at home:  · Take over-the-counter medicines only as told by your doctor.  · Drink enough fluids to keep your pee (urine) clear or pale yellow.  · Rest when you feel you need to.  · To help with pain, try:  ? Sipping warm liquids, such as broth, herbal tea, or warm water.  ? Eating or drinking cold or frozen liquids, such as frozen ice pops.  ? Gargling with a salt-water mixture 3-4 times a day or as needed. To make a salt-water mixture, add ½-1 tsp of salt in 1 cup of warm water. Mix it until you cannot see the salt anymore.  ? Sucking on hard candy or throat lozenges.  ? Putting a cool-mist humidifier in your bedroom at night.  ? Sitting in the bathroom with the door closed for 5-10 minutes while you run hot water in the shower.  · Do not use any tobacco products, such as cigarettes, chewing tobacco, and e-cigarettes. If you need help quitting, ask your doctor.  Contact a doctor if:  · You have a fever for more than 2-3 days.  · You keep having symptoms for more than 2-3 days.  · Your throat does not get better in 7 days.  · You have a fever and your symptoms suddenly get worse.  Get help right away if:  · You have trouble breathing.  · You cannot swallow fluids, soft foods, or your saliva.  · You have swelling in your throat or neck that gets worse.  · You keep feeling like you are going to throw up (vomit).  · You keep throwing up.  This information is not intended to replace advice given to you by your health care provider. Make sure you discuss any questions you have with your health care provider.  Document Released: 09/26/2009 Document Revised: 08/13/2017 Document Reviewed: 10/07/2016  [employer] Interactive Patient Education © 2018 [employer]

## 2019-01-25 NOTE — PROGRESS NOTES
"Sanjana Asher is a 63 y.o. female.   Chief Complaint   Patient presents with   • URI     Started Monday night       History of Present Illness   Patient is here with complaint of cough and congestion, thick, yellow nasal drainage started on Monday.   Has had allergy symptoms \"off and on\" since November. Has seen her ENT ( for scratchy throat) and allergist last month. Saw a neurologist for dizziness earlier this month, was prescribed a steroid. Has had issues with URI r/t allergies for several years.  The following portions of the patient's history were reviewed and updated as appropriate: allergies, current medications, past family history, past medical history, past social history, past surgical history and problem list.    Review of Systems   Constitutional: Positive for fatigue. Negative for chills and fever.   HENT: Positive for congestion, postnasal drip, rhinorrhea, sinus pressure, sinus pain, sore throat and voice change. Negative for ear pain and sneezing.    Eyes: Positive for discharge (watery). Negative for itching.   Respiratory: Positive for cough, shortness of breath and wheezing.    Gastrointestinal: Negative for diarrhea and nausea.   Musculoskeletal: Negative for arthralgias and myalgias.   Neurological: Positive for headaches. Negative for dizziness and light-headedness.       Objective   Physical Exam   Constitutional: She appears well-developed and well-nourished. No distress.   HENT:   Head: Normocephalic and atraumatic.   Right Ear: External ear normal.   Left Ear: External ear normal.   Nose: Right sinus exhibits maxillary sinus tenderness and frontal sinus tenderness. Left sinus exhibits maxillary sinus tenderness and frontal sinus tenderness.   Mouth/Throat: Uvula is midline and mucous membranes are normal. No posterior oropharyngeal erythema.   Cardiovascular: Normal rate, regular rhythm and normal heart sounds.   No murmur heard.  Pulmonary/Chest: Effort normal and breath " sounds normal. No stridor. She has no wheezes.   Neurological: She is alert.   Skin: She is not diaphoretic.   Vitals reviewed.      Assessment/Plan   Kathy was seen today for uri.    Diagnoses and all orders for this visit:    Flu-like symptoms  -     POCT Influenza A/B    Acute non-recurrent pansinusitis  -     amoxicillin-clavulanate (AUGMENTIN) 875-125 MG per tablet; Take 1 tablet by mouth Every 12 (Twelve) Hours for 10 days.    Cough  -     guaifenesin-codeine (GUAIFENESIN AC) 100-10 MG/5ML liquid; Take 5 mL by mouth 3 (Three) Times a Day As Needed for Cough.          Results for orders placed or performed in visit on 01/25/19   POCT Influenza A/B   Result Value Ref Range    Rapid Influenza A Ag Negative Negative    Rapid Influenza B Ag Negative Negative    Internal Control Passed Passed    Lot Number [id number]     Expiration Date 03/01/2021      Rapid flu test negative in office today.  Augmentin prescribed for sinusitis, continue antihistamine and Flonase,    Guaifenesin Ac prescribed for cough, advised plenty of fluids.  Follow up with allergist or ENT if no improvement.  Patient was encouraged to keep me informed of any acute changes, lack of improvement, or any new concerning symptoms.Patient voiced understanding of all instructions and denied further questions.

## 2019-01-25 NOTE — TELEPHONE ENCOUNTER
Pt called and stated that she has a cold/URI symptoms since Monday and requesting abx/steroids. Pt complains of congestion, wheezing, SOB but denies any fever. Please advise. Pt can be reached @ [phone].

## 2019-01-25 NOTE — TELEPHONE ENCOUNTER
Spoke with pt and she went to see PCP and was prescribed abx due to having sinus infection. Pt notified to contact office once therapy competed and still not feeling any better. Pt verbalized understanding.

## 2019-02-19 ENCOUNTER — OFFICE VISIT (OUTPATIENT)
Dept: GYNECOLOGIC ONCOLOGY | Facility: CLINIC | Age: 64
End: 2019-02-19

## 2019-02-19 VITALS
TEMPERATURE: 97.1 F | WEIGHT: 194 LBS | HEART RATE: 85 BPM | DIASTOLIC BLOOD PRESSURE: 69 MMHG | RESPIRATION RATE: 14 BRPM | SYSTOLIC BLOOD PRESSURE: 127 MMHG | BODY MASS INDEX: 34.37 KG/M2

## 2019-02-19 DIAGNOSIS — D07.2 VAIN III (VAGINAL INTRAEPITHELIAL NEOPLASIA GRADE III): Primary | ICD-10-CM

## 2019-02-19 DIAGNOSIS — D07.1 VIN III (VULVAR INTRAEPITHELIAL NEOPLASIA III): ICD-10-CM

## 2019-02-19 DIAGNOSIS — B37.2 SKIN YEAST INFECTION: ICD-10-CM

## 2019-02-19 DIAGNOSIS — N87.1 CIN II (CERVICAL INTRAEPITHELIAL NEOPLASIA II): ICD-10-CM

## 2019-02-19 PROCEDURE — 99214 OFFICE O/P EST MOD 30 MIN: CPT | Performed by: NURSE PRACTITIONER

## 2019-02-19 RX ORDER — NYSTATIN 100000 U/G
CREAM TOPICAL 2 TIMES DAILY PRN
Qty: 30 G | Refills: 2 | Status: SHIPPED | OUTPATIENT
Start: 2019-02-19 | End: 2019-07-02

## 2019-02-19 NOTE — PROGRESS NOTES
GYN ONCOLOGY FOLLOW-UP    Kathy Asher  [id number]  1955    Chief Complaint: Follow-up (rash x 2 weeks)        History of present illness:  Kathy Asher is a 63 y.o. 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 She is s/p upper vaginectomy on 6/27/2017. She also has a h/o ELA III and ANNABELLA II, see history below. Last pap smear on 1/29/2018 showed persistent LSIL, stable from previous paps.   Upon arrival today she reports a rash to the bilateral groin and vulva x 2 weeks. Symptoms started following a course of steroids and antibiotics for a sinus infection. She describes raised bumps with itching and mild genital odor. She denies obvious vaginal discharge. Symptoms are similar to those one year ago after a course of antibiotics. She has been applying Desitin to relieve the irritated skin and shea butter to areas that are healing and dry. She reports rash is improving as of the last few days.    Dysplasia history:  2007: LEEP with Dr. Gupta. Recurrent LSIL pap smears every 6 months after that time  8/27/2014: Biopsy of posterior vulvar lesion, path revealed ELA III. Referred to Dr. Harrell for second opinion  10/2/2014: Colpo with biopsies, revealed mild squamous dysplasia with HPV effect  12/10/2014: LAVH/BSO with vulvar WLE. Pathology consistent with ELA III, left margin focally positive. Cervix showed extensive endocervical moderate squamous epithelial dysplasia with HPV effect (ANNABELLA II).   4/18/2016: Pap smear of vaginal cuff showed LSIL with HPV non 16/18+  4/28/2016: Vaginoscopy with biopsy, pathology revealed severe dysplasia with HPV effect  6/27/2016: WLE of vaginal dysplasia, pathology consistent with ELA III, margins negative for dysplasia but positive for HPV effect  1/31/2017: Pap smear of vaginal cuff showed LSIL, stable considering history  1/29/2018: Pap of cuff shows persistent LSIL, stable      Oncology History:       Severe vaginal dysplasia     5/2/2016 Initial Diagnosis     Severe vaginal dysplasia         ANNABELLA II (cervical intraepithelial neoplasia II)     Initial Diagnosis     ANNABELLA II (cervical intraepithelial neoplasia II)          Past Medical History:   Diagnosis Date   • Abnormal finding on lung imaging     CT scan of the chest 06/04/2014 reports interval improvement in the right perihilar masslike opacity and stability in the left perihilar opacity, pulmonary nodules and lymphadenopathy.   • Abnormal Pap smear of cervix    • Allergic rhinitis    • Asthma    • Cervical dysplasia     CIN2   • History of chest x-ray 08/27/2015    interval increase in patchy opacity within the right upper lobe compared to prior study; previously described left mid lung opacity appears similar to the prior study; dextrocurvature of thoracic spine with mild degenerative changes of the spine    • History of chest x-ray 07/09/2012    extensive, masslike and infiltrating disease of the right upper lobe and mild left perihilar disease   • History of chest x-ray 11/17/2010    chest is hyperinflated; infiltrate in right upper lobe apical segment with some associated adenopathy and old granulomatous changes    • History of histoplasmosis    • History of PFTs 06/06/2014    data is acceptable and reproducible; pt given 3 puffs of albuterol; pt gave good effort    • History of PFTs 08/12/2013    data is acceptable and reproducible; pt given 3 puffs of albuterol; pt gave good effort    • History of PFTs 01/24/2011    pt gave good effort; spirometry is acceptable and reproducible;    • IBS (irritable bowel syndrome)    • Sarcoidosis     Diagnosed by lymph node biopsy in 2000.  She has well preserved spirometry.   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   • Vulvar dysplasia     VIN3       Past Surgical History:   Procedure Laterality Date   • BREAST BIOPSY     • COLONOSCOPY     • DIAGNOSTIC LAPAROSCOPY EXPLORATORY LAPAROTOMY     • EXCISION LESION  06/27/2016    vaginal WLE   •  LYMPH NODE BIOPSY     • MOUTH SURGERY         MEDICATIONS: The current medication list was reviewed and reconciled.     Allergies:  is allergic to ciprofloxacin and etodolac.    Family History   Problem Relation Age of Onset   • Hyperlipidemia Mother    • Hypertension Mother    • Arthritis Mother    • Heart disease Father    • Diabetes Sister    • Hyperlipidemia Sister    • Diabetes Maternal Grandmother        Review of Systems   Constitutional: Negative for appetite change, chills, fatigue, fever and unexpected weight change.   Respiratory: Negative for cough, shortness of breath and wheezing.    Cardiovascular: Negative for chest pain, palpitations and leg swelling.   Gastrointestinal: Negative for abdominal distention, abdominal pain, blood in stool, constipation, diarrhea, nausea and vomiting.   Endocrine: Negative.    Genitourinary: Negative for dyspareunia, dysuria, frequency, genital sores, hematuria, pelvic pain, urgency, vaginal bleeding, vaginal discharge and vaginal pain.   Musculoskeletal: Negative for arthralgias, gait problem and joint swelling.   Skin: Positive for rash (groin, external genitalia).   Neurological: Negative for dizziness, seizures, syncope, weakness, light-headedness, numbness and headaches.   Hematological: Negative for adenopathy.   Psychiatric/Behavioral: Negative.        Physical Exam  Vital Signs: /69   Pulse 85   Temp 97.1 °F (36.2 °C) (Temporal)   Resp 14   Wt 88 kg (194 lb)   BMI 34.37 kg/m²    General Appearance:  alert, cooperative, no apparent distress and appears stated age   Neurologic/Psychiatric: A&O x 3, gait steady, appropriate affect   HEENT:  Normocephalic, without obvious abnormality, mucous membranes moist   Lungs:   Clear to auscultation bilaterally; respirations regular, even, and unlabored bilaterally   Heart:  Regular rate and rhythm, no murmurs appreciated   Breasts:  deferred   Abdomen:   Soft, non-tender, non-distended and no organomegaly   Lymph  nodes: No inguinal adenopathy noted   Extremities: Normal, atraumatic; no clubbing, cyanosis, or edema    Pelvic: External Genitalia  raised rash noted to bilateral inguinal folds and external vulva, appears to be healing. No suspicious lesions  Vagina  is pink, moist, without lesions.   Vaginal Cuff  Female Vaginal Cuff: smooth, intact, without visible lesions and pap obtained  Uterus  surgically absent, no masses appreciated  Ovaries  surgically absent bilaterallly  Parametria  smooth  Rectovaginal  Female rectovaginal: deferred         Procedure Notes:  No notes on file    Assessment and Plan:    Kathy was seen today for follow-up.    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      There is no evidence of disease or new lesions upon today's exam. She is understanding to call with any changes in pelvic symptoms or general GYN concerns at any time between regularly scheduled visits.     Pap was done today. If she does not receive the results of the Pap within 2 weeks  time, she was instructed to call to find out the results.      She will continue get yearly physicals with her primary care, who also manages her well woman screenings.     Genital rash symptoms appear consistent with healing yeast dermatitis in inguinal folds. Similar symptoms occurred after antibiotics last year for an URI. We discussed the relationship between antibiotics and yeast infections. I recommended topical nystatin cream to apply to bilateral groin PRN. She v/u.       Return to clinic in 1 year for ongoing VAIN surveillance.      Lina Butcher, APRN      Note: Speech  recognition transcription software was used to dictate portions of this document.  An attempt at proofreading has been made though minor errors in transcription may still be present.  Please do not hesitate to call our office with any questions.

## 2019-02-26 ENCOUNTER — TELEPHONE (OUTPATIENT)
Dept: GYNECOLOGIC ONCOLOGY | Facility: CLINIC | Age: 64
End: 2019-02-26

## 2019-02-26 NOTE — TELEPHONE ENCOUNTER
Swetha Harrell MD  [facility]   Cc: Lina Butcher, APRN             Please notify patient of normal pap      02/26/19:  I called and informed pt of the above.  She voiced understanding.

## 2019-07-02 ENCOUNTER — HOSPITAL ENCOUNTER (OUTPATIENT)
Dept: GENERAL RADIOLOGY | Facility: HOSPITAL | Age: 64
Discharge: HOME | End: 2019-07-02
Admitting: NURSE PRACTITIONER

## 2019-07-02 ENCOUNTER — OFFICE VISIT (OUTPATIENT)
Dept: FAMILY MEDICINE CLINIC | Facility: CLINIC | Age: 64
End: 2019-07-02

## 2019-07-02 VITALS
SYSTOLIC BLOOD PRESSURE: 140 MMHG | WEIGHT: 190 LBS | HEART RATE: 82 BPM | BODY MASS INDEX: 33.66 KG/M2 | HEIGHT: 63 IN | TEMPERATURE: 97.6 F | DIASTOLIC BLOOD PRESSURE: 90 MMHG | OXYGEN SATURATION: 100 %

## 2019-07-02 DIAGNOSIS — M25.561 ACUTE PAIN OF RIGHT KNEE: Primary | ICD-10-CM

## 2019-07-02 DIAGNOSIS — M25.561 ACUTE PAIN OF RIGHT KNEE: ICD-10-CM

## 2019-07-02 DIAGNOSIS — J30.89 NON-SEASONAL ALLERGIC RHINITIS, UNSPECIFIED TRIGGER: ICD-10-CM

## 2019-07-02 PROBLEM — H52.229 REGULAR ASTIGMATISM: Status: ACTIVE | Noted: 2018-08-17

## 2019-07-02 PROCEDURE — 99214 OFFICE O/P EST MOD 30 MIN: CPT | Performed by: NURSE PRACTITIONER

## 2019-07-02 PROCEDURE — 73560 X-RAY EXAM OF KNEE 1 OR 2: CPT

## 2019-07-02 RX ORDER — AZELASTINE HYDROCHLORIDE, FLUTICASONE PROPIONATE 137; 50 UG/1; UG/1
SPRAY, METERED NASAL
COMMUNITY
End: 2019-07-02

## 2019-07-02 RX ORDER — CETIRIZINE HYDROCHLORIDE, PSEUDOEPHEDRINE HYDROCHLORIDE 5; 120 MG/1; MG/1
TABLET, FILM COATED, EXTENDED RELEASE ORAL
COMMUNITY
End: 2019-07-02

## 2019-07-02 RX ORDER — ALBUTEROL SULFATE 90 UG/1
AEROSOL, METERED RESPIRATORY (INHALATION)
COMMUNITY
End: 2019-07-02

## 2019-07-02 NOTE — PROGRESS NOTES
Chief Complaint   Patient presents with   • Leg Pain     She has been experiencing pain along the bend of her knee and along the calf of her right leg        Subjective   Kathy Asher is a 63 y.o. female    History of Present Illness    Patient has been experiencing some right knee pain.  It was occurring for over a week but of the last couple days is gotten some better.  Pain was mostly in the posterior part of the knee, patient reports going up steps is painful in both knees and both of felt like they want to give way with going up and down steps.  She seems to have better improvement coming down the steps backwards.  Patient did some BIOflex and some Advil.  Has seemed to help the pain some.  Pain is 1 out of 10 currently.  She feels there is something there but not significantly hurting.    She is also reports that she had a sinus like infection this past Saturday and blew some bloody drainage out then.  She has not had any further since Saturday.  She does do regular Zyrtec at night Zyrtec-D in the morning.  She also uses Singulair at night.    Allergies   Allergen Reactions   • Ciprofloxacin Other (See Comments)     Mouth felt funny, very dry   • Etodolac Other (See Comments)     Slurred speech      Past Medical History:   Diagnosis Date   • Abnormal finding on lung imaging     CT scan of the chest 06/04/2014 reports interval improvement in the right perihilar masslike opacity and stability in the left perihilar opacity, pulmonary nodules and lymphadenopathy.   • Abnormal Pap smear of cervix    • Allergic rhinitis    • Asthma    • Cervical dysplasia     CIN2   • History of chest x-ray 08/27/2015    interval increase in patchy opacity within the right upper lobe compared to prior study; previously described left mid lung opacity appears similar to the prior study; dextrocurvature of thoracic spine with mild degenerative changes of the spine    • History of chest x-ray 07/09/2012    extensive, masslike and  infiltrating disease of the right upper lobe and mild left perihilar disease   • History of chest x-ray 11/17/2010    chest is hyperinflated; infiltrate in right upper lobe apical segment with some associated adenopathy and old granulomatous changes    • History of histoplasmosis    • History of PFTs 06/06/2014    data is acceptable and reproducible; pt given 3 puffs of albuterol; pt gave good effort    • History of PFTs 08/12/2013    data is acceptable and reproducible; pt given 3 puffs of albuterol; pt gave good effort    • History of PFTs 01/24/2011    pt gave good effort; spirometry is acceptable and reproducible;    • IBS (irritable bowel syndrome)    • Sarcoidosis     Diagnosed by lymph node biopsy in 2000.  She has well preserved spirometry.   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   • Vulvar dysplasia     VIN3      Past Surgical History:   Procedure Laterality Date   • BREAST BIOPSY     • COLONOSCOPY     • DIAGNOSTIC LAPAROSCOPY EXPLORATORY LAPAROTOMY     • EXCISION LESION  06/27/2016    vaginal WLE   • LYMPH NODE BIOPSY     • MOUTH SURGERY       Social History     Socioeconomic History   • Marital status: Single     Spouse name: Not on file   • Number of children: Not on file   • Years of education: Not on file   • Highest education level: Not on file   Occupational History   • Occupation: retired    Tobacco Use   • Smoking status: Never Smoker   • Smokeless tobacco: Never Used   Substance and Sexual Activity   • Alcohol use: No   • Drug use: No   • Sexual activity: Defer     Comment: single        Current Outpatient Medications:   •  albuterol (PROAIR HFA) 108 (90 Base) MCG/ACT inhaler, Inhale 2 puffs Every 4 (Four) Hours As Needed for Wheezing., Disp: 1 inhaler, Rfl: 11  •  Ascorbic Acid (VITAMIN C) 500 MG capsule, Take  by mouth., Disp: , Rfl:   •  azelastine (ASTELIN) 0.1 % nasal spray, U 2 SPRAYS IEN BID PRF ALLERGIES, Disp: , Rfl: 11  •  Calcium-Magnesium-Vitamin D - MG-MG-UNIT  tablet sustained-release 24 hour, Take 1 tablet by mouth Daily., Disp: , Rfl:   •  cetirizine (zyrTEC) 10 MG tablet, Take 10 mg by mouth Daily., Disp: , Rfl:   •  cetirizine-pseudoephedrine (ZYRTEC-D ALLERGY & CONGESTION) 5-120 MG per 12 hr tablet, Take  by mouth., Disp: , Rfl:   •  fluticasone (FLONASE) 50 MCG/ACT nasal spray, into each nostril 2 (two) times a day., Disp: , Rfl:   •  latanoprost (XALATAN) 0.005 % ophthalmic solution, Apply  to eye., Disp: , Rfl:   •  montelukast (SINGULAIR) 10 MG tablet, TK 1 T PO IN THE DEREK, Disp: , Rfl: 11  •  Multiple Vitamin (MULTI-VITAMIN) tablet, Take  by mouth., Disp: , Rfl:   •  Vitamin E 400 UNITS tablet, Take  by mouth., Disp: , Rfl:      Review of Systems   Constitutional: Negative for chills, fatigue and unexpected weight change.   HENT: Positive for congestion, nosebleeds ( better), postnasal drip and rhinorrhea.    Eyes: Positive for discharge and itching.   Respiratory: Negative for cough, shortness of breath and wheezing.    Cardiovascular: Negative for chest pain, palpitations and leg swelling.   Gastrointestinal: Negative for constipation, diarrhea, nausea and vomiting.   Genitourinary: Negative for difficulty urinating and dysuria.   Musculoskeletal: Positive for arthralgias ( Right knee).   Skin: Negative for color change and rash.   Allergic/Immunologic: Positive for environmental allergies.   Neurological: Negative for dizziness, syncope, weakness and headaches.   Psychiatric/Behavioral: Negative for sleep disturbance.       Objective     Vitals:    07/02/19 1133   BP: 140/90   Pulse: 82   Temp: 97.6 °F (36.4 °C)   SpO2: 100%       Results for orders placed or performed in visit on 01/25/19   POCT Influenza A/B   Result Value Ref Range    Rapid Influenza A Ag Negative Negative    Rapid Influenza B Ag Negative Negative    Internal Control Passed Passed    Lot Number [id number]     Expiration Date 03/01/2021        Physical Exam   Constitutional: She is oriented  to person, place, and time. She appears well-developed and well-nourished.   HENT:   Head: Normocephalic and atraumatic.   Right Ear: Hearing and external ear normal. A middle ear effusion is present.   Left Ear: Hearing and external ear normal. A foreign body (wax) is present.   Nose: Mucosal edema present. Right sinus exhibits no maxillary sinus tenderness and no frontal sinus tenderness. Left sinus exhibits no maxillary sinus tenderness and no frontal sinus tenderness.   Mouth/Throat: Posterior oropharyngeal erythema present.   Cardiovascular: Normal rate, regular rhythm and normal heart sounds.   Pulmonary/Chest: Effort normal and breath sounds normal.   Musculoskeletal: She exhibits no edema.        Right knee: She exhibits normal range of motion, no swelling and no MCL laxity. Tenderness found. Lateral joint line and LCL tenderness noted. No patellar tendon tenderness noted.   Neurological: She is alert and oriented to person, place, and time.   Skin: Skin is warm and dry.   Psychiatric: She has a normal mood and affect. Her behavior is normal.   Vitals reviewed.      Assessment/Plan   Problem List Items Addressed This Visit        Respiratory    Allergic rhinitis      Other Visit Diagnoses     Acute pain of right knee    -  Primary    Relevant Orders    XR Knee 3 View Right      Patient continue to do her Zyrtec, Zyrtec-D, Singulair, and azelastine nasal spray.  She is to let me know if these symptoms return.  Patient to continue to see her allergist.    Her knee pain we will get an x-ray of her right knee and if this is okay we will proceed with physical therapy, anti-inflammatories.  Patient was encouraged to keep me informed of any acute changes, lack of improvement, or any new concerning symptoms.Plan of care discussed with pt. They verbalized understanding and agreement.     RV- 1 mo for Physical    Counseling provided on Knee pain and allergic rhinitis.    Larry Yu, APRN   7/2/2019   11:56 AM

## 2019-07-02 NOTE — PATIENT INSTRUCTIONS
Knee Exercises  Ask your health care provider which exercises are safe for you. Do exercises exactly as told by your health care provider and adjust them as directed. It is normal to feel mild stretching, pulling, tightness, or discomfort as you do these exercises, but you should stop right away if you feel sudden pain or your pain gets worse. Do not begin these exercises until told by your health care provider.  STRETCHING AND RANGE OF MOTION EXERCISES  These exercises warm up your muscles and joints and improve the movement and flexibility of your knee. These exercises also help to relieve pain, numbness, and tingling.  Exercise A: Knee Extension, Prone  1. Lie on your abdomen on a bed.  2. Place your left / right knee just beyond the edge of the surface so your knee is not on the bed. You can put a towel under your left / right thigh just above your knee for comfort.  3. Relax your leg muscles and allow gravity to straighten your knee. You should feel a stretch behind your left / right knee.  4. Hold this position for __________ seconds.  5. Scoot up so your knee is supported between repetitions.  Repeat __________ times. Complete this stretch __________ times a day.  Exercise B: Knee Flexion, Active    1. Lie on your back with both knees straight. If this causes back discomfort, bend your left / right knee so your foot is flat on the floor.  2. Slowly slide your left / right heel back toward your buttocks until you feel a gentle stretch in the front of your knee or thigh.  3. Hold this position for __________ seconds.  4. Slowly slide your left / right heel back to the starting position.  Repeat __________ times. Complete this exercise __________ times a day.  Exercise C: Quadriceps, Prone    1. Lie on your abdomen on a firm surface, such as a bed or padded floor.  2. Bend your left / right knee and hold your ankle. If you cannot reach your ankle or pant leg, loop a belt around your foot and grab the belt  instead.  3. Gently pull your heel toward your buttocks. Your knee should not slide out to the side. You should feel a stretch in the front of your thigh and knee.  4. Hold this position for __________ seconds.  Repeat __________ times. Complete this stretch __________ times a day.  Exercise D: Hamstring, Supine  1. Lie on your back.  2. Loop a belt or towel over the ball of your left / right foot. The ball of your foot is on the walking surface, right under your toes.  3. Straighten your left / right knee and slowly pull on the belt to raise your leg until you feel a gentle stretch behind your knee.  ? Do not let your left / right knee bend while you do this.  ? Keep your other leg flat on the floor.  4. Hold this position for __________ seconds.  Repeat __________ times. Complete this stretch __________ times a day.  STRENGTHENING EXERCISES  These exercises build strength and endurance in your knee. Endurance is the ability to use your muscles for a long time, even after they get tired.  Exercise E: Quadriceps, Isometric    1. Lie on your back with your left / right leg extended and your other knee bent. Put a rolled towel or small pillow under your knee if told by your health care provider.  2. Slowly tense the muscles in the front of your left / right thigh. You should see your kneecap slide up toward your hip or see increased dimpling just above the knee. This motion will push the back of the knee toward the floor.  3. For __________ seconds, keep the muscle as tight as you can without increasing your pain.  4. Relax the muscles slowly and completely.  Repeat __________ times. Complete this exercise __________ times a day.  Exercise F: Straight Leg Raises - Quadriceps  1. Lie on your back with your left / right leg extended and your other knee bent.  2. Tense the muscles in the front of your left / right thigh. You should see your kneecap slide up or see increased dimpling just above the knee. Your thigh may  even shake a bit.  3. Keep these muscles tight as you raise your leg 4-6 inches (10-15 cm) off the floor. Do not let your knee bend.  4. Hold this position for __________ seconds.  5. Keep these muscles tense as you lower your leg.  6. Relax your muscles slowly and completely after each repetition.  Repeat __________ times. Complete this exercise __________ times a day.  Exercise G: Hamstring, Isometric  1. Lie on your back on a firm surface.  2. Bend your left / right knee approximately __________ degrees.  3. Dig your left / right heel into the surface as if you are trying to pull it toward your buttocks. Tighten the muscles in the back of your thighs to dig as hard as you can without increasing any pain.  4. Hold this position for __________ seconds.  5. Release the tension gradually and allow your muscles to relax completely for __________ seconds after each repetition.  Repeat __________ times. Complete this exercise __________ times a day.  Exercise H: Hamstring Curls    If told by your health care provider, do this exercise while wearing ankle weights. Begin with __________ weights. Then increase the weight by 1 lb (0.5 kg) increments. Do not wear ankle weights that are more than __________.  1. Lie on your abdomen with your legs straight.  2. Bend your left / right knee as far as you can without feeling pain. Keep your hips flat against the floor.  3. Hold this position for __________ seconds.  4. Slowly lower your leg to the starting position.    Repeat __________ times. Complete this exercise __________ times a day.  Exercise I: Squats (Quadriceps)  1.  front of a table, with your feet and knees pointing straight ahead. You may rest your hands on the table for balance but not for support.  2. Slowly bend your knees and lower your hips like you are going to sit in a chair.  ? Keep your weight over your heels, not over your toes.  ? Keep your lower legs upright so they are parallel with the table  legs.  ? Do not let your hips go lower than your knees.  ? Do not bend lower than told by your health care provider.  ? If your knee pain increases, do not bend as low.  3. Hold the squat position for __________ seconds.  4. Slowly push with your legs to return to standing. Do not use your hands to pull yourself to standing.  Repeat __________ times. Complete this exercise __________ times a day.  Exercise J: Wall Slides (Quadriceps)    1. Lean your back against a smooth wall or door while you walk your feet out 18-24 inches (46-61 cm) from it.  2. Place your feet hip-width apart.  3. Slowly slide down the wall or door until your knees bend __________ degrees. Keep your knees over your heels, not over your toes. Keep your knees in line with your hips.  4. Hold for __________ seconds.  Repeat __________ times. Complete this exercise __________ times a day.  Exercise K: Straight Leg Raises - Hip Abductors  1. Lie on your side with your left / right leg in the top position. Lie so your head, shoulder, knee, and hip line up. You may bend your bottom knee to help you keep your balance.  2. Roll your hips slightly forward so your hips are stacked directly over each other and your left / right knee is facing forward.  3. Leading with your heel, lift your top leg 4-6 inches (10-15 cm). You should feel the muscles in your outer hip lifting.  ? Do not let your foot drift forward.  ? Do not let your knee roll toward the ceiling.  4. Hold this position for __________ seconds.  5. Slowly return your leg to the starting position.  6. Let your muscles relax completely after each repetition.  Repeat __________ times. Complete this exercise __________ times a day.  Exercise L: Straight Leg Raises - Hip Extensors  1. Lie on your abdomen on a firm surface. You can put a pillow under your hips if that is more comfortable.  2. Tense the muscles in your buttocks and lift your left / right leg about 4-6 inches (10-15 cm). Keep your knee  straight as you lift your leg.  3. Hold this position for __________ seconds.  4. Slowly lower your leg to the starting position.  5. Let your leg relax completely after each repetition.  Repeat __________ times. Complete this exercise __________ times a day.  This information is not intended to replace advice given to you by your health care provider. Make sure you discuss any questions you have with your health care provider.  Document Released: 11/01/2006 Document Revised: 09/11/2017 Document Reviewed: 10/23/2016  [employer] Interactive Patient Education © 2019 [employer]  Allergic Rhinitis, Adult  Allergic rhinitis is an allergic reaction that affects the mucous membrane inside the nose. It causes sneezing, a runny or stuffy nose, and the feeling of mucus going down the back of the throat (postnasal drip). Allergic rhinitis can be mild to severe.  There are two types of allergic rhinitis:  · Seasonal. This type is also called hay fever. It happens only during certain seasons.  · Perennial. This type can happen at any time of the year.    What are the causes?  This condition happens when the body's defense system (immune system) responds to certain harmless substances called allergens as though they were germs.   Seasonal allergic rhinitis is triggered by pollen, which can come from grasses, trees, and weeds. Perennial allergic rhinitis may be caused by:  · House dust mites.  · Pet dander.  · Mold spores.    What are the signs or symptoms?  Symptoms of this condition include:  · Sneezing.  · Runny or stuffy nose (nasal congestion).  · Postnasal drip.  · Itchy nose.  · Tearing of the eyes.  · Trouble sleeping.  · Daytime sleepiness.    How is this diagnosed?  This condition may be diagnosed based on:  · Your medical history.  · A physical exam.  · Tests to check for related conditions, such as:  ? Asthma.  ? Pink eye.  ? Ear infection.  ? Upper respiratory infection.  · Tests to find out which allergens trigger  your symptoms. These may include skin or blood tests.    How is this treated?  There is no cure for this condition, but treatment can help control symptoms. Treatment may include:  · Taking medicines that block allergy symptoms, such as antihistamines. Medicine may be given as a shot, nasal spray, or pill.  · Avoiding the allergen.  · Desensitization. This treatment involves getting ongoing shots until your body becomes less sensitive to the allergen. This treatment may be done if other treatments do not help.  · If taking medicine and avoiding the allergen does not work, new, stronger medicines may be prescribed.    Follow these instructions at home:  · Find out what you are allergic to. Common allergens include smoke, dust, and pollen.  · Avoid the things you are allergic to. These are some things you can do to help avoid allergens:  ? Replace carpet with wood, tile, or vinyl jolly. Carpet can trap dander and dust.  ? Do not smoke. Do not allow smoking in your home.  ? Change your heating and air conditioning filter at least once a month.  ? During allergy season:  § Keep windows closed as much as possible.  § Plan outdoor activities when pollen counts are lowest. This is usually during the evening hours.  § When coming indoors, change clothing and shower before sitting on furniture or bedding.  · Take over-the-counter and prescription medicines only as told by your health care provider.  · Keep all follow-up visits as told by your health care provider. This is important.  Contact a health care provider if:  · You have a fever.  · You develop a persistent cough.  · You make whistling sounds when you breathe (you wheeze).  · Your symptoms interfere with your normal daily activities.  Get help right away if:  · You have shortness of breath.  Summary  · This condition can be managed by taking medicines as directed and avoiding allergens.  · Contact your health care provider if you develop a persistent cough or  fever.  · During allergy season, keep windows closed as much as possible.  This information is not intended to replace advice given to you by your health care provider. Make sure you discuss any questions you have with your health care provider.  Document Released: 09/12/2002 Document Revised: 01/25/2018 Document Reviewed: 01/25/2018  [employer] Interactive Patient Education © 2019 [employer]

## 2019-07-15 ENCOUNTER — TELEPHONE (OUTPATIENT)
Dept: FAMILY MEDICINE CLINIC | Facility: CLINIC | Age: 64
End: 2019-07-15

## 2019-08-14 ENCOUNTER — OFFICE VISIT (OUTPATIENT)
Dept: FAMILY MEDICINE CLINIC | Facility: CLINIC | Age: 64
End: 2019-08-14

## 2019-08-14 VITALS
OXYGEN SATURATION: 98 % | HEIGHT: 63 IN | HEART RATE: 78 BPM | SYSTOLIC BLOOD PRESSURE: 118 MMHG | TEMPERATURE: 97.5 F | DIASTOLIC BLOOD PRESSURE: 80 MMHG | WEIGHT: 181 LBS | BODY MASS INDEX: 32.07 KG/M2

## 2019-08-14 DIAGNOSIS — Z00.00 WELLNESS EXAMINATION: Primary | ICD-10-CM

## 2019-08-14 DIAGNOSIS — Z12.39 SCREENING FOR BREAST CANCER: ICD-10-CM

## 2019-08-14 DIAGNOSIS — Z13.1 SCREENING FOR DIABETES MELLITUS (DM): ICD-10-CM

## 2019-08-14 DIAGNOSIS — Z13.220 SCREENING FOR LIPID DISORDERS: ICD-10-CM

## 2019-08-14 PROCEDURE — 99396 PREV VISIT EST AGE 40-64: CPT | Performed by: NURSE PRACTITIONER

## 2019-08-14 NOTE — PROGRESS NOTES
Patient Care Team:  Larry Yu APRN as PCP - General (Family Medicine)  Lina Butcher APRN as Nurse Practitioner (Nurse Practitioner)     Chief complaint: Patient is in today for a physical     Patient is a 63 y.o. female who presents for her yearly physical exam.     HPI patient in for routine yearly physical exam.  She does complain on some worsening nasal congestion which started yesterday.  She does have a history of allergic rhinitis he does take Zyrtec and Zyrtec-D at times.  He also takes Singulair 10 mg daily and azelastine as well as Flonase nasal sprays. Weight is down 9 lbs from 1 month ago.  Patient did have [facility] screening yesterday.    Review of Systems   Constitutional: Negative for appetite change, chills, fatigue and fever.   HENT: Positive for congestion and postnasal drip. Negative for ear pain, rhinorrhea, sinus pressure and sore throat.         Hears heart beat in head   Eyes: Positive for discharge and itching. Negative for visual disturbance.   Respiratory: Positive for wheezing (maybe a little, using IN[facility], last time was Sun and Mon. ). Negative for cough and shortness of breath.    Cardiovascular: Positive for palpitations (rare syncopated beat.  Reviewed last EKG and does show a right bundle branch block.  Is 1 from 2014 and 2016) and leg swelling. Negative for chest pain.   Gastrointestinal: Negative for abdominal pain, constipation, diarrhea ( some with certian foods), nausea and vomiting.   Endocrine: Negative for cold intolerance and heat intolerance.   Genitourinary: Negative for difficulty urinating, dysuria, hematuria and vaginal bleeding.   Musculoskeletal: Negative for arthralgias ( occasionally), back pain ( occasionally), joint swelling and myalgias.   Skin: Negative for rash and wound.   Allergic/Immunologic: Positive for environmental allergies. Negative for food allergies.   Neurological: Positive for headaches (increased more recently). Negative for  dizziness and numbness.   Hematological: Negative for adenopathy. Does not bruise/bleed easily.   Psychiatric/Behavioral: Negative for dysphoric mood and sleep disturbance ( goes to sleep easily). The patient is not nervous/anxious.       History  Past Medical History:   Diagnosis Date   • Abnormal finding on lung imaging     CT scan of the chest 06/04/2014 reports interval improvement in the right perihilar masslike opacity and stability in the left perihilar opacity, pulmonary nodules and lymphadenopathy.   • Abnormal Pap smear of cervix    • Allergic rhinitis    • Asthma    • Cervical dysplasia     CIN2   • History of chest x-ray 08/27/2015    interval increase in patchy opacity within the right upper lobe compared to prior study; previously described left mid lung opacity appears similar to the prior study; dextrocurvature of thoracic spine with mild degenerative changes of the spine    • History of chest x-ray 07/09/2012    extensive, masslike and infiltrating disease of the right upper lobe and mild left perihilar disease   • History of chest x-ray 11/17/2010    chest is hyperinflated; infiltrate in right upper lobe apical segment with some associated adenopathy and old granulomatous changes    • History of histoplasmosis    • History of PFTs 06/06/2014    data is acceptable and reproducible; pt given 3 puffs of albuterol; pt gave good effort    • History of PFTs 08/12/2013    data is acceptable and reproducible; pt given 3 puffs of albuterol; pt gave good effort    • History of PFTs 01/24/2011    pt gave good effort; spirometry is acceptable and reproducible;    • IBS (irritable bowel syndrome)    • Sarcoidosis     Diagnosed by lymph node biopsy in 2000.  She has well preserved spirometry.   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   • Vulvar dysplasia     VIN3      Past Surgical History:   Procedure Laterality Date   • BREAST BIOPSY     • COLONOSCOPY     • DIAGNOSTIC LAPAROSCOPY EXPLORATORY LAPAROTOMY      • EXCISION LESION  06/27/2016    vaginal WLE   • LYMPH NODE BIOPSY     • MOUTH SURGERY        Allergies   Allergen Reactions   • Ciprofloxacin Other (See Comments)     Mouth felt funny, very dry   • Etodolac Other (See Comments)     Slurred speech       Family History   Problem Relation Age of Onset   • Hyperlipidemia Mother    • Hypertension Mother    • Arthritis Mother    • Heart disease Father    • Diabetes Sister    • Hyperlipidemia Sister    • Diabetes Maternal Grandmother      Social History     Socioeconomic History   • Marital status: Single     Spouse name: Not on file   • Number of children: Not on file   • Years of education: Not on file   • Highest education level: Not on file   Occupational History   • Occupation: retired    Tobacco Use   • Smoking status: Never Smoker   • Smokeless tobacco: Never Used   Substance and Sexual Activity   • Alcohol use: No   • Drug use: No   • Sexual activity: Defer     Comment: single        Current Outpatient Medications:   •  albuterol (PROAIR HFA) 108 (90 Base) MCG/ACT inhaler, Inhale 2 puffs Every 4 (Four) Hours As Needed for Wheezing., Disp: 1 inhaler, Rfl: 11  •  Ascorbic Acid (VITAMIN C) 500 MG capsule, Take  by mouth., Disp: , Rfl:   •  azelastine (ASTELIN) 0.1 % nasal spray, U 2 SPRAYS IEN BID PRF ALLERGIES, Disp: , Rfl: 11  •  Calcium-Magnesium-Vitamin D - MG-MG-UNIT tablet sustained-release 24 hour, Take 1 tablet by mouth Daily., Disp: , Rfl:   •  cetirizine (zyrTEC) 10 MG tablet, Take 10 mg by mouth Daily., Disp: , Rfl:   •  cetirizine-pseudoephedrine (ZYRTEC-D ALLERGY & CONGESTION) 5-120 MG per 12 hr tablet, Take  by mouth., Disp: , Rfl:   •  fluticasone (FLONASE) 50 MCG/ACT nasal spray, into each nostril 2 (two) times a day., Disp: , Rfl:   •  latanoprost (XALATAN) 0.005 % ophthalmic solution, Apply  to eye., Disp: , Rfl:   •  montelukast (SINGULAIR) 10 MG tablet, TK 1 T PO IN THE DEREK, Disp: , Rfl: 11  •  Multiple Vitamin (MULTI-VITAMIN)  tablet, Take  by mouth., Disp: , Rfl:   •  Vitamin E 400 UNITS tablet, Take  by mouth., Disp: , Rfl:     Immunizations   N/A   Prescribed/Refused   Date     Td/Tdap  (Booster Q 10 yrs)   [x]           Prescribed    []     Refused        []           Flu  (Yearly)   []        Prescribed    [x]     Refused        []           Pneumovax  (1 yr after Prevnar)   [x]        Prescribed    []     Refused        []           Prevnar 13  (1 yr after Pneumo)   [x]        Prescribed    []     Refused        []           Hep B     [x]        Prescribed    []     Refused        []           Shingles  (Age 50 and older)   []        Prescribed    [x]     Refused        []       Patient reminded to get this from her pharmacy.     Immunization History   Administered Date(s) Administered   • Td 08/01/2007   • Tdap 08/03/2017     Health Maintenance   Topic Date Due   • ZOSTER VACCINE (1 of 2) 12/19/2004   • INFLUENZA VACCINE  08/01/2019   • MAMMOGRAM  08/07/2020   • ANNUAL PHYSICAL  08/15/2020   • COLONOSCOPY  10/14/2021   • PAP SMEAR  02/19/2022   • TDAP/TD VACCINES (2 - Td) 08/03/2027   • HEPATITIS C SCREENING  Completed        Diabetes  [] Yes  [x] No   N/A      Date     Eye Exam     []             []   Complete     []   Recommended Date:  Where:       Foot Exam     []         []   Complete          Obesity Counseling     []       []   Complete     No results found for: HGBA1C, MICROALBUR    Additional Testing      Date     Colorectal Screening       []   N/A   []   Complete    []   Ordered     Date:    Where:       Pap      []   N/A   []   Complete   [x]   OB/GYN Date:    Where:       Mammogram        []   N/A   []   Complete   [x]   Ordered Date:    Where:     PSA  (Over age 50)    [x]   N/A   []   Complete   []   Ordered Date:    Where:     US Aorta  (For male smokers, age 65)     [x]   N/A   []   Complete   []   Ordered Date:    Where:     CT for Smoker  (Age 55-75, 30 pk yr)    [x]   N/A   []   Complete   []   Ordered  "Date:    Where:     Bone Density/DEXA      [x]   N/A   []   Complete   []   Ordered Date:    Follow-up:     Hep. C  ( 7614-1162)      [x]   N/A   [x]   Complete   []   Ordered Date:    Where:     Results for orders placed or performed in visit on 19   POCT Influenza A/B   Result Value Ref Range    Rapid Influenza A Ag Negative Negative    Rapid Influenza B Ag Negative Negative    Internal Control Passed Passed    Lot Number [id number]     Expiration Date 2021             /80   Pulse 78   Temp 97.5 °F (36.4 °C)   Ht 160 cm (63\")   Wt 82.1 kg (181 lb)   SpO2 98%   BMI 32.06 kg/m²       Physical Exam   Constitutional: She is oriented to person, place, and time. She appears well-developed and well-nourished. No distress.   HENT:   Head: Normocephalic and atraumatic.   Right Ear: External ear normal. A foreign body ( Bilateral mild to moderate wax.) is present.   Left Ear: External ear normal. A foreign body is present.   Nose: Mucosal edema present. Right sinus exhibits maxillary sinus tenderness ( slight). Right sinus exhibits no frontal sinus tenderness. Left sinus exhibits maxillary sinus tenderness ( slight). Left sinus exhibits no frontal sinus tenderness.   Mouth/Throat: Oropharynx is clear and moist.   Eyes: Conjunctivae and EOM are normal. Pupils are equal, round, and reactive to light. Right eye exhibits no discharge. Left eye exhibits no discharge. No scleral icterus.   Neck: Normal range of motion. Neck supple. No JVD (no bruits) present. No tracheal deviation present. No thyromegaly present.   Cardiovascular: Normal rate, regular rhythm, normal heart sounds and intact distal pulses. Exam reveals no gallop and no friction rub.   No murmur heard.  Pulmonary/Chest: Effort normal and breath sounds normal. No respiratory distress. She has no wheezes. She has no rales. She exhibits no tenderness. Right breast exhibits no inverted nipple, no mass, no nipple discharge, no skin change and no " tenderness. Left breast exhibits no inverted nipple, no mass, no nipple discharge, no skin change and no tenderness. Breasts are symmetrical.   Abdominal: Soft. Normal appearance and bowel sounds are normal. She exhibits no distension and no mass. There is no hepatosplenomegaly. There is no tenderness. No hernia.   Genitourinary:   Genitourinary Comments: Breast and vaginal exam deferred to GYN.   Musculoskeletal: Normal range of motion. She exhibits no edema or deformity.        Right knee: She exhibits normal range of motion, normal meniscus and no MCL laxity. Tenderness found. Medial joint line and lateral joint line tenderness noted.   Lymphadenopathy:     She has no cervical adenopathy.   Neurological: She is alert and oriented to person, place, and time. She has normal reflexes. She displays normal reflexes.   Skin: Skin is warm and dry. No rash noted. She is not diaphoretic. No erythema. No pallor.   Round dark lesion on right medial calf region. Pt reports this is unchanged for many years   Psychiatric: She has a normal mood and affect. Her behavior is normal. Thought content normal.   Nursing note and vitals reviewed.          Counseling provided on weight management.    Diagnoses and all orders for this visit:    Wellness examination  -     Comprehensive Metabolic Panel; Future  -     Lipid Panel; Future    Screening for diabetes mellitus (DM)  -     Comprehensive Metabolic Panel; Future    Screening for lipid disorders  -     Lipid Panel; Future    Screening for breast cancer  -     Mammo Screening Digital Tomosynthesis Bilateral With CAD; Future    The wellness exam has been reviewed in detail.  The patient has been fully counseled on preventative guidelines for vaccines, cancer screenings, and other health maintenance needs.  Functional testing has been performed to assess capacity for independent living and need for other medical interventions.   The patient was counseled on maintaining a lifestyle to  promote good health and to minimize chronic diseases.  The patient has been assisted with scheduling healthcare procedures for the coming year and given a written document outlining these recommendations.     Will obtain routine labs and/or x-rays today and make further recommendations pending results.     Discussed need for weight management. Discussed weight loss with diet, recommend Weight Watchers or Mediterranean Diet, but stated that whatever method works for this patient is best. Reviewed need for regular exercise.  The patient agrees with all recommendations at this time. I have discussed a weight loss plan to be determined by this patient.             Larry Yu, APRN   8/14/2019   10:08 AM

## 2019-08-14 NOTE — PATIENT INSTRUCTIONS

## 2019-08-21 ENCOUNTER — LAB (OUTPATIENT)
Dept: LAB | Facility: HOSPITAL | Age: 64
End: 2019-08-21

## 2019-08-21 DIAGNOSIS — Z00.00 WELLNESS EXAMINATION: ICD-10-CM

## 2019-08-21 DIAGNOSIS — Z13.1 SCREENING FOR DIABETES MELLITUS (DM): ICD-10-CM

## 2019-08-21 DIAGNOSIS — Z13.220 SCREENING FOR LIPID DISORDERS: ICD-10-CM

## 2019-08-21 LAB
ALBUMIN SERPL-MCNC: 4.2 G/DL (ref 3.5–5.2)
ALBUMIN/GLOB SERPL: 1.2 G/DL
ALP SERPL-CCNC: 47 U/L (ref 39–117)
ALT SERPL W P-5'-P-CCNC: 15 U/L (ref 1–33)
ANION GAP SERPL CALCULATED.3IONS-SCNC: 6.8 MMOL/L (ref 5–15)
AST SERPL-CCNC: 20 U/L (ref 1–32)
BILIRUB SERPL-MCNC: 0.4 MG/DL (ref 0.2–1.2)
BUN BLD-MCNC: 12 MG/DL (ref 8–23)
BUN/CREAT SERPL: 12.6 (ref 7–25)
CALCIUM SPEC-SCNC: 8.6 MG/DL (ref 8.6–10.5)
CHLORIDE SERPL-SCNC: 99 MMOL/L (ref 98–107)
CHOLEST SERPL-MCNC: 188 MG/DL (ref 0–200)
CO2 SERPL-SCNC: 30.2 MMOL/L (ref 22–29)
CREAT BLD-MCNC: 0.95 MG/DL (ref 0.57–1)
GFR SERPL CREATININE-BSD FRML MDRD: 72 ML/MIN/1.73
GLOBULIN UR ELPH-MCNC: 3.4 GM/DL
GLUCOSE BLD-MCNC: 95 MG/DL (ref 65–99)
HDLC SERPL-MCNC: 55 MG/DL (ref 40–60)
LDLC SERPL CALC-MCNC: 104 MG/DL (ref 0–100)
LDLC/HDLC SERPL: 1.89 {RATIO}
POTASSIUM BLD-SCNC: 4.5 MMOL/L (ref 3.5–5.2)
PROT SERPL-MCNC: 7.6 G/DL (ref 6–8.5)
SODIUM BLD-SCNC: 136 MMOL/L (ref 136–145)
TRIGL SERPL-MCNC: 145 MG/DL (ref 0–150)
VLDLC SERPL-MCNC: 29 MG/DL (ref 5–40)

## 2019-08-21 PROCEDURE — 80053 COMPREHEN METABOLIC PANEL: CPT

## 2019-08-21 PROCEDURE — 80061 LIPID PANEL: CPT

## 2019-09-09 ENCOUNTER — OFFICE VISIT (OUTPATIENT)
Dept: FAMILY MEDICINE CLINIC | Facility: CLINIC | Age: 64
End: 2019-09-09

## 2019-09-09 VITALS
OXYGEN SATURATION: 99 % | HEART RATE: 75 BPM | TEMPERATURE: 97.3 F | HEIGHT: 63 IN | SYSTOLIC BLOOD PRESSURE: 140 MMHG | BODY MASS INDEX: 33.49 KG/M2 | DIASTOLIC BLOOD PRESSURE: 90 MMHG | WEIGHT: 189 LBS

## 2019-09-09 DIAGNOSIS — R60.0 LEG EDEMA, RIGHT: ICD-10-CM

## 2019-09-09 DIAGNOSIS — R03.0 ELEVATED BP WITHOUT DIAGNOSIS OF HYPERTENSION: ICD-10-CM

## 2019-09-09 DIAGNOSIS — M79.604 RIGHT LEG PAIN: Primary | ICD-10-CM

## 2019-09-09 PROCEDURE — 99213 OFFICE O/P EST LOW 20 MIN: CPT | Performed by: NURSE PRACTITIONER

## 2019-09-09 NOTE — PROGRESS NOTES
"Subjective   Kathy Asher is a 63 y.o. female.   Chief Complaint   Patient presents with   • Leg Pain     She is having left leg pain, she describes it as pressure. She says it feels like muscle pain   • Neck Pain     She is having similar pains as her leg but it extends up to her neck as well        History of Present Illness   Patient is here with complaint of right leg pain, moves from below knee to upper thigh, has chronic right knee pain, but the leg pain started about a week ago and woke her up this morning. Also woke up with right neck pain that went away in \"just a few minutes\".   Last week she took ibuprofen and Aleve that helped the leg pain.  She had an xray in July for the right knee pain that was in the bend of knee down her calf, no abnormality found.  States she did walk more than usual on Saturday, walked 3-4 hours off and on with [employer] program, describes as a \"painful kind of pull\"   Patient denies history of hypertension, does eat a lot of salt, denies chest pain, dizziness, but does have occ headache.  The following portions of the patient's history were reviewed and updated as appropriate: allergies, current medications and problem list.    Review of Systems   Constitutional: Positive for chills (Friday and Saturday). Negative for fatigue and fever.   Respiratory: Positive for shortness of breath (r/t allergies). Negative for cough and wheezing.    Cardiovascular: Positive for leg swelling (worse on right). Negative for chest pain and palpitations.   Allergic/Immunologic: Positive for environmental allergies.   Neurological: Positive for headaches (occ). Negative for dizziness and light-headedness.       Objective   Physical Exam   Constitutional: She is oriented to person, place, and time. She appears well-developed and well-nourished. No distress.   HENT:   Head: Normocephalic and atraumatic.   Right Ear: External ear normal.   Left Ear: External ear normal.   Mouth/Throat: No " oropharyngeal exudate.   Eyes: No scleral icterus.   Cardiovascular: Normal rate, regular rhythm and normal heart sounds.   No murmur heard.  Pulmonary/Chest: Effort normal and breath sounds normal. No stridor. No respiratory distress. She has no wheezes.   Musculoskeletal: She exhibits edema (1+BLE) and tenderness (right medial posterial knee and right lateral upper leg muscle).   Neurological: She is alert and oriented to person, place, and time.   Skin: Skin is warm. She is not diaphoretic.   Psychiatric: She has a normal mood and affect. Her behavior is normal. Thought content normal.   Vitals reviewed.      Assessment/Plan   Kathy was seen today for leg pain and neck pain.    Diagnoses and all orders for this visit:    Right leg pain  -     Duplex Venous Lower Extremity - Right CAR; Future    Leg edema, right  -     Duplex Venous Lower Extremity - Right CAR; Future    Elevated BP without diagnosis of hypertension        Duplex doppler ordered to rule out DVT as cause of pain. Seems related to overuse over the weekend, advised rest, may apply ice pack to painful areas, tylenol or ibuprofen for pain. Discussed reasons to go to the ER: shortness of breath, worsening leg pain, chest pain.  Provided DASH diet, advised to monitor BP, follow up with PCP if   /80 or higher consistently.  Patient was encouraged to keep me informed of any acute changes, lack of improvement, or any new concerning symptoms.

## 2019-09-09 NOTE — PATIENT INSTRUCTIONS
"DASH Eating Plan  DASH stands for \"Dietary Approaches to Stop Hypertension.\" The DASH eating plan is a healthy eating plan that has been shown to reduce high blood pressure (hypertension). It may also reduce your risk for type 2 diabetes, heart disease, and stroke. The DASH eating plan may also help with weight loss.  What are tips for following this plan?    General guidelines  · Avoid eating more than 2,300 mg (milligrams) of salt (sodium) a day. If you have hypertension, you may need to reduce your sodium intake to 1,500 mg a day.  · Limit alcohol intake to no more than 1 drink a day for nonpregnant women and 2 drinks a day for men. One drink equals 12 oz of beer, 5 oz of wine, or 1½ oz of hard liquor.  · Work with your health care provider to maintain a healthy body weight or to lose weight. Ask what an ideal weight is for you.  · Get at least 30 minutes of exercise that causes your heart to beat faster (aerobic exercise) most days of the week. Activities may include walking, swimming, or biking.  · Work with your health care provider or diet and nutrition specialist (dietitian) to adjust your eating plan to your individual calorie needs.  Reading food labels    · Check food labels for the amount of sodium per serving. Choose foods with less than 5 percent of the Daily Value of sodium. Generally, foods with less than 300 mg of sodium per serving fit into this eating plan.  · To find whole grains, look for the word \"whole\" as the first word in the ingredient list.  Shopping  · Buy products labeled as \"low-sodium\" or \"no salt added.\"  · Buy fresh foods. Avoid canned foods and premade or frozen meals.  Cooking  · Avoid adding salt when cooking. Use salt-free seasonings or herbs instead of table salt or sea salt. Check with your health care provider or pharmacist before using salt substitutes.  · Do not davidson foods. Cook foods using healthy methods such as baking, boiling, grilling, and broiling instead.  · Cook with " heart-healthy oils, such as olive, canola, soybean, or sunflower oil.  Meal planning  · Eat a balanced diet that includes:  ? 5 or more servings of fruits and vegetables each day. At each meal, try to fill half of your plate with fruits and vegetables.  ? Up to 6-8 servings of whole grains each day.  ? Less than 6 oz of lean meat, poultry, or fish each day. A 3-oz serving of meat is about the same size as a deck of cards. One egg equals 1 oz.  ? 2 servings of low-fat dairy each day.  ? A serving of nuts, seeds, or beans 5 times each week.  ? Heart-healthy fats. Healthy fats called Omega-3 fatty acids are found in foods such as flaxseeds and coldwater fish, like sardines, salmon, and mackerel.  · Limit how much you eat of the following:  ? Canned or prepackaged foods.  ? Food that is high in trans fat, such as fried foods.  ? Food that is high in saturated fat, such as fatty meat.  ? Sweets, desserts, sugary drinks, and other foods with added sugar.  ? Full-fat dairy products.  · Do not salt foods before eating.  · Try to eat at least 2 vegetarian meals each week.  · Eat more home-cooked food and less restaurant, buffet, and fast food.  · When eating at a restaurant, ask that your food be prepared with less salt or no salt, if possible.  What foods are recommended?  The items listed may not be a complete list. Talk with your dietitian about what dietary choices are best for you.  Grains  Whole-grain or whole-wheat bread. Whole-grain or whole-wheat pasta. Brown rice. Oatmeal. Quinoa. Bulgur. Whole-grain and low-sodium cereals. Ingrid bread. Low-fat, low-sodium crackers. Whole-wheat flour tortillas.  Vegetables  Fresh or frozen vegetables (raw, steamed, roasted, or grilled). Low-sodium or reduced-sodium tomato and vegetable juice. Low-sodium or reduced-sodium tomato sauce and tomato paste. Low-sodium or reduced-sodium canned vegetables.  Fruits  All fresh, dried, or frozen fruit. Canned fruit in natural juice (without  added sugar).  Meat and other protein foods  Skinless chicken or turkey. Ground chicken or turkey. Pork with fat trimmed off. Fish and seafood. Egg whites. Dried beans, peas, or lentils. Unsalted nuts, nut butters, and seeds. Unsalted canned beans. Lean cuts of beef with fat trimmed off. Low-sodium, lean deli meat.  Dairy  Low-fat (1%) or fat-free (skim) milk. Fat-free, low-fat, or reduced-fat cheeses. Nonfat, low-sodium ricotta or cottage cheese. Low-fat or nonfat yogurt. Low-fat, low-sodium cheese.  Fats and oils  Soft margarine without trans fats. Vegetable oil. Low-fat, reduced-fat, or light mayonnaise and salad dressings (reduced-sodium). Canola, safflower, olive, soybean, and sunflower oils. Avocado.  Seasoning and other foods  Herbs. Spices. Seasoning mixes without salt. Unsalted popcorn and pretzels. Fat-free sweets.  What foods are not recommended?  The items listed may not be a complete list. Talk with your dietitian about what dietary choices are best for you.  Grains  Baked goods made with fat, such as croissants, muffins, or some breads. Dry pasta or rice meal packs.  Vegetables  Creamed or fried vegetables. Vegetables in a cheese sauce. Regular canned vegetables (not low-sodium or reduced-sodium). Regular canned tomato sauce and paste (not low-sodium or reduced-sodium). Regular tomato and vegetable juice (not low-sodium or reduced-sodium). Pickles. Olives.  Fruits  Canned fruit in a light or heavy syrup. Fried fruit. Fruit in cream or butter sauce.  Meat and other protein foods  Fatty cuts of meat. Ribs. Fried meat. Donald. Sausage. Bologna and other processed lunch meats. Salami. Fatback. Hotdogs. Bratwurst. Salted nuts and seeds. Canned beans with added salt. Canned or smoked fish. Whole eggs or egg yolks. Chicken or turkey with skin.  Dairy  Whole or 2% milk, cream, and half-and-half. Whole or full-fat cream cheese. Whole-fat or sweetened yogurt. Full-fat cheese. Nondairy creamers. Whipped toppings.  Processed cheese and cheese spreads.  Fats and oils  Butter. Stick margarine. Lard. Shortening. Ghee. Donald fat. Tropical oils, such as coconut, palm kernel, or palm oil.  Seasoning and other foods  Salted popcorn and pretzels. Onion salt, garlic salt, seasoned salt, table salt, and sea salt. Worcestershire sauce. Tartar sauce. Barbecue sauce. Teriyaki sauce. Soy sauce, including reduced-sodium. Steak sauce. Canned and packaged gravies. Fish sauce. Oyster sauce. Cocktail sauce. Horseradish that you find on the shelf. Ketchup. Mustard. Meat flavorings and tenderizers. Bouillon cubes. Hot sauce and Tabasco sauce. Premade or packaged marinades. Premade or packaged taco seasonings. Relishes. Regular salad dressings.  Where to find more information:  · National Heart, Lung, and Blood [place]: www.nhlbi.nih.gov  · American Heart Association: www.heart.org  Summary  · The DASH eating plan is a healthy eating plan that has been shown to reduce high blood pressure (hypertension). It may also reduce your risk for type 2 diabetes, heart disease, and stroke.  · With the DASH eating plan, you should limit salt (sodium) intake to 2,300 mg a day. If you have hypertension, you may need to reduce your sodium intake to 1,500 mg a day.  · When on the DASH eating plan, aim to eat more fresh fruits and vegetables, whole grains, lean proteins, low-fat dairy, and heart-healthy fats.  · Work with your health care provider or diet and nutrition specialist (dietitian) to adjust your eating plan to your individual calorie needs.  This information is not intended to replace advice given to you by your health care provider. Make sure you discuss any questions you have with your health care provider.  Document Released: 12/06/2012 Document Revised: 12/11/2017 Document Reviewed: 12/11/2017  [employer] Interactive Patient Education © 2019 [employer]    Alternate tylenol and ibuprofen for pain, may also try heat and ice packs to leg, elevate leg  when sitting   Monitor BP at home, follow up with Larry Yu for high blood pressure  How to Take Your Blood Pressure  You can take your blood pressure at home with a machine. You may need to check your blood pressure at home:  · To check if you have high blood pressure (hypertension).  · To check your blood pressure over time.  · To make sure your blood pressure medicine is working.  Supplies needed:  You will need a blood pressure machine, or monitor. You can buy one at a [employer] or online. When choosing one:  · Choose one with an arm cuff.  · Choose one that wraps around your upper arm. Only one finger should fit between your arm and the cuff.  · Do not choose one that measures your blood pressure from your wrist or finger.  Your doctor can suggest a monitor.  How to prepare  Avoid these things for 30 minutes before checking your blood pressure:  · Drinking caffeine.  · Drinking alcohol.  · Eating.  · Smoking.  · Exercising.  Five minutes before checking your blood pressure:  · Pee.  · Sit in a dining chair. Avoid sitting in a soft couch or armchair.  · Be quiet. Do not talk.  How to take your blood pressure  Follow the instructions that came with your machine. If you have a digital blood pressure monitor, these may be the instructions:  1. Sit up straight.  2. Place your feet on the floor. Do not cross your ankles or legs.  3. Rest your left arm at the level of your heart. You may rest it on a table, desk, or chair.  4. Pull up your shirt sleeve.  5. Wrap the blood pressure cuff around the upper part of your left arm. The cuff should be 1 inch (2.5 cm) above your elbow. It is best to wrap the cuff around bare skin.  6. Fit the cuff snugly around your arm. You should be able to place only one finger between the cuff and your arm.  7. Put the cord inside the groove of your elbow.  8. Press the power button.  9. Sit quietly while the cuff fills with air and loses air.  10. Write down the numbers on the  "screen.  11. Wait 2-3 minutes and then repeat steps 1-10.  What do the numbers mean?  Two numbers make up your blood pressure. The first number is called systolic pressure. The second is called diastolic pressure. An example of a blood pressure reading is \"120 over 80\" (or 120/80).  If you are an adult and do not have a medical condition, use this guide to find out if your blood pressure is normal:  Normal  · First number: below 120.  · Second number: below 80.  Elevated  · First number: 120-129.  · Second number: below 80.  Hypertension stage 1  · First number: 130-139.  · Second number: 80-89.  Hypertension stage 2  · First number: 140 or above.  · Second number: 90 or above.  Your blood pressure is above normal even if only the top or bottom number is above normal.  Follow these instructions at home:  · Check your blood pressure as often as your doctor tells you to.  · Take your monitor to your next doctor's appointment. Your doctor will:  ? Make sure you are using it correctly.  ? Make sure it is working right.  · Make sure you understand what your blood pressure numbers should be.  · Tell your doctor if your medicines are causing side effects.  Contact a doctor if:  · Your blood pressure keeps being high.  Get help right away if:  · Your first blood pressure number is higher than 180.  · Your second blood pressure number is higher than 120.  This information is not intended to replace advice given to you by your health care provider. Make sure you discuss any questions you have with your health care provider.  Document Released: 11/30/2009 Document Revised: 11/15/2017 Document Reviewed: 05/26/2017  [employer] Interactive Patient Education © 2019 [employer]    Hypertension  Hypertension is another name for high blood pressure. High blood pressure forces your heart to work harder to pump blood. This can cause problems over time.  There are two numbers in a blood pressure reading. There is a top number (systolic) " over a bottom number (diastolic). It is best to have a blood pressure below 120/80. Healthy choices can help lower your blood pressure. You may need medicine to help lower your blood pressure if:  · Your blood pressure cannot be lowered with healthy choices.  · Your blood pressure is higher than 130/80.  Follow these instructions at home:  Eating and drinking    · If directed, follow the DASH eating plan. This diet includes:  ? Filling half of your plate at each meal with fruits and vegetables.  ? Filling one quarter of your plate at each meal with whole grains. Whole grains include whole wheat pasta, brown rice, and whole grain bread.  ? Eating or drinking low-fat dairy products, such as skim milk or low-fat yogurt.  ? Filling one quarter of your plate at each meal with low-fat (lean) proteins. Low-fat proteins include fish, skinless chicken, eggs, beans, and tofu.  ? Avoiding fatty meat, cured and processed meat, or chicken with skin.  ? Avoiding premade or processed food.  · Eat less than 1,500 mg of salt (sodium) a day.  · Limit alcohol use to no more than 1 drink a day for nonpregnant women and 2 drinks a day for men. One drink equals 12 oz of beer, 5 oz of wine, or 1½ oz of hard liquor.  Lifestyle  · Work with your doctor to stay at a healthy weight or to lose weight. Ask your doctor what the best weight is for you.  · Get at least 30 minutes of exercise that causes your heart to beat faster (aerobic exercise) most days of the week. This may include walking, swimming, or biking.  · Get at least 30 minutes of exercise that strengthens your muscles (resistance exercise) at least 3 days a week. This may include lifting weights or pilates.  · Do not use any products that contain nicotine or tobacco. This includes cigarettes and e-cigarettes. If you need help quitting, ask your doctor.  · Check your blood pressure at home as told by your doctor.  · Keep all follow-up visits as told by your doctor. This is  important.  Medicines  · Take over-the-counter and prescription medicines only as told by your doctor. Follow directions carefully.  · Do not skip doses of blood pressure medicine. The medicine does not work as well if you skip doses. Skipping doses also puts you at risk for problems.  · Ask your doctor about side effects or reactions to medicines that you should watch for.  Contact a doctor if:  · You think you are having a reaction to the medicine you are taking.  · You have headaches that keep coming back (recurring).  · You feel dizzy.  · You have swelling in your ankles.  · You have trouble with your vision.  Get help right away if:  · You get a very bad headache.  · You start to feel confused.  · You feel weak or numb.  · You feel faint.  · You get very bad pain in your:  ? Chest.  ? Belly (abdomen).  · You throw up (vomit) more than once.  · You have trouble breathing.  Summary  · Hypertension is another name for high blood pressure.  · Making healthy choices can help lower blood pressure. If your blood pressure cannot be controlled with healthy choices, you may need to take medicine.  This information is not intended to replace advice given to you by your health care provider. Make sure you discuss any questions you have with your health care provider.  Document Released: 06/05/2009 Document Revised: 11/15/2017 Document Reviewed: 11/15/2017  [employer] Interactive Patient Education © 2019 [employer]

## 2019-09-20 ENCOUNTER — TELEPHONE (OUTPATIENT)
Dept: FAMILY MEDICINE CLINIC | Facility: CLINIC | Age: 64
End: 2019-09-20

## 2019-09-20 ENCOUNTER — HOSPITAL ENCOUNTER (OUTPATIENT)
Dept: CARDIOLOGY | Facility: HOSPITAL | Age: 64
Discharge: HOME | End: 2019-09-20
Admitting: NURSE PRACTITIONER

## 2019-09-20 VITALS — WEIGHT: 188.93 LBS | BODY MASS INDEX: 33.48 KG/M2 | HEIGHT: 63 IN

## 2019-09-20 DIAGNOSIS — R60.0 LEG EDEMA, RIGHT: ICD-10-CM

## 2019-09-20 DIAGNOSIS — M79.604 RIGHT LEG PAIN: ICD-10-CM

## 2019-09-20 LAB
BH CV LOWER VASCULAR LEFT COMMON FEMORAL AUGMENT: NORMAL
BH CV LOWER VASCULAR LEFT COMMON FEMORAL COMPRESS: NORMAL
BH CV LOWER VASCULAR LEFT COMMON FEMORAL PHASIC: NORMAL
BH CV LOWER VASCULAR LEFT COMMON FEMORAL SPONT: NORMAL
BH CV LOWER VASCULAR RIGHT COMMON FEMORAL AUGMENT: NORMAL
BH CV LOWER VASCULAR RIGHT COMMON FEMORAL COMPRESS: NORMAL
BH CV LOWER VASCULAR RIGHT COMMON FEMORAL PHASIC: NORMAL
BH CV LOWER VASCULAR RIGHT COMMON FEMORAL SPONT: NORMAL
BH CV LOWER VASCULAR RIGHT DISTAL FEMORAL AUGMENT: NORMAL
BH CV LOWER VASCULAR RIGHT DISTAL FEMORAL COMPRESS: NORMAL
BH CV LOWER VASCULAR RIGHT DISTAL FEMORAL PHASIC: NORMAL
BH CV LOWER VASCULAR RIGHT DISTAL FEMORAL SPONT: NORMAL
BH CV LOWER VASCULAR RIGHT GASTRONEMIUS COMPRESS: NORMAL
BH CV LOWER VASCULAR RIGHT GREATER SAPH AK COMPRESS: NORMAL
BH CV LOWER VASCULAR RIGHT GREATER SAPH BK COMPRESS: NORMAL
BH CV LOWER VASCULAR RIGHT LESSER SAPH COMPRESS: NORMAL
BH CV LOWER VASCULAR RIGHT MID FEMORAL AUGMENT: NORMAL
BH CV LOWER VASCULAR RIGHT MID FEMORAL COMPRESS: NORMAL
BH CV LOWER VASCULAR RIGHT MID FEMORAL PHASIC: NORMAL
BH CV LOWER VASCULAR RIGHT MID FEMORAL SPONT: NORMAL
BH CV LOWER VASCULAR RIGHT PERONEAL COMPRESS: NORMAL
BH CV LOWER VASCULAR RIGHT POPLITEAL AUGMENT: NORMAL
BH CV LOWER VASCULAR RIGHT POPLITEAL COMPRESS: NORMAL
BH CV LOWER VASCULAR RIGHT POPLITEAL PHASIC: NORMAL
BH CV LOWER VASCULAR RIGHT POPLITEAL SPONT: NORMAL
BH CV LOWER VASCULAR RIGHT POSTERIOR TIBIAL COMPRESS: NORMAL
BH CV LOWER VASCULAR RIGHT PROFUNDA FEMORAL COMPRESS: NORMAL
BH CV LOWER VASCULAR RIGHT PROFUNDA FEMORAL PHASIC: NORMAL
BH CV LOWER VASCULAR RIGHT PROFUNDA FEMORAL SPONT: NORMAL
BH CV LOWER VASCULAR RIGHT PROXIMAL FEMORAL AUGMENT: NORMAL
BH CV LOWER VASCULAR RIGHT PROXIMAL FEMORAL COMPRESS: NORMAL
BH CV LOWER VASCULAR RIGHT PROXIMAL FEMORAL PHASIC: NORMAL
BH CV LOWER VASCULAR RIGHT PROXIMAL FEMORAL SPONT: NORMAL
BH CV LOWER VASCULAR RIGHT SAPHENOFEMORAL JUNCTION COMPRESS: NORMAL
BH CV LOWER VASCULAR RIGHT SAPHENOFEMORAL JUNCTION PHASIC: NORMAL
BH CV LOWER VASCULAR RIGHT SAPHENOFEMORAL JUNCTION SPONT: NORMAL

## 2019-09-20 PROCEDURE — 93971 EXTREMITY STUDY: CPT

## 2019-09-20 PROCEDURE — 93971 EXTREMITY STUDY: CPT | Performed by: INTERNAL MEDICINE

## 2019-09-20 NOTE — TELEPHONE ENCOUNTER
Patient called back and was notified of U/S results per Idania. Patient verbalized understanding and had no additional questions.

## 2019-09-20 NOTE — TELEPHONE ENCOUNTER
Result Notes for Duplex Venous Lower Extremity - Right CAR     Notes recorded by Lata Chakraborty MA on 9/20/2019 at 2:46 PM EDT  Tried calling pt, no answer. LVM to call back.  ------    Notes recorded by Idania York APRN on 9/20/2019 at 2:40 PM EDT  Let patient know her US was negative for blood clot

## 2019-10-25 ENCOUNTER — OFFICE VISIT (OUTPATIENT)
Dept: FAMILY MEDICINE CLINIC | Facility: CLINIC | Age: 64
End: 2019-10-25

## 2019-10-25 VITALS
SYSTOLIC BLOOD PRESSURE: 120 MMHG | TEMPERATURE: 97.8 F | BODY MASS INDEX: 33.31 KG/M2 | WEIGHT: 188 LBS | HEIGHT: 63 IN | HEART RATE: 78 BPM | OXYGEN SATURATION: 98 % | DIASTOLIC BLOOD PRESSURE: 80 MMHG

## 2019-10-25 DIAGNOSIS — J01.00 ACUTE NON-RECURRENT MAXILLARY SINUSITIS: Primary | ICD-10-CM

## 2019-10-25 PROCEDURE — 99213 OFFICE O/P EST LOW 20 MIN: CPT | Performed by: NURSE PRACTITIONER

## 2019-10-25 RX ORDER — AMOXICILLIN AND CLAVULANATE POTASSIUM 875; 125 MG/1; MG/1
1 TABLET, FILM COATED ORAL 2 TIMES DAILY
Qty: 14 TABLET | Refills: 0 | Status: SHIPPED | OUTPATIENT
Start: 2019-10-25 | End: 2019-11-13

## 2019-10-25 RX ORDER — BROMPHENIRAMINE MALEATE, PSEUDOEPHEDRINE HYDROCHLORIDE, AND DEXTROMETHORPHAN HYDROBROMIDE 2; 30; 10 MG/5ML; MG/5ML; MG/5ML
5 SYRUP ORAL 4 TIMES DAILY PRN
Qty: 120 ML | Refills: 1 | Status: SHIPPED | OUTPATIENT
Start: 2019-10-25 | End: 2020-02-13

## 2019-10-25 NOTE — PROGRESS NOTES
Chief Complaint   Patient presents with   • URI     She has been experiencing some congestion and tightness in her chest.        Subjective   Kathy Asher is a 63 y.o. female    History of Present Illness  Patient with complaints of upper respiratory symptoms for about a week.  She is having coughing, nasal and chest congestion and throat and tightness in her chest.  She has been experiencing fevers and chills.  She has been taken Mucinex in addition to Zyrtec 10 mg daily, and Zyrtec-D in the morning, Flonase nasal spray daily, Singulair 10 mg daily and azelastine nasal spray.  She is breathing but they do not seem to be improving her symptoms.    Allergies   Allergen Reactions   • Ciprofloxacin Other (See Comments)     Mouth felt funny, very dry   • Etodolac Other (See Comments)     Slurred speech      Past Medical History:   Diagnosis Date   • Abnormal finding on lung imaging     CT scan of the chest 06/04/2014 reports interval improvement in the right perihilar masslike opacity and stability in the left perihilar opacity, pulmonary nodules and lymphadenopathy.   • Abnormal Pap smear of cervix    • Allergic rhinitis    • Asthma    • Cervical dysplasia     CIN2   • History of chest x-ray 08/27/2015    interval increase in patchy opacity within the right upper lobe compared to prior study; previously described left mid lung opacity appears similar to the prior study; dextrocurvature of thoracic spine with mild degenerative changes of the spine    • History of chest x-ray 07/09/2012    extensive, masslike and infiltrating disease of the right upper lobe and mild left perihilar disease   • History of chest x-ray 11/17/2010    chest is hyperinflated; infiltrate in right upper lobe apical segment with some associated adenopathy and old granulomatous changes    • History of histoplasmosis    • History of PFTs 06/06/2014    data is acceptable and reproducible; pt given 3 puffs of albuterol; pt gave good effort    •  History of PFTs 08/12/2013    data is acceptable and reproducible; pt given 3 puffs of albuterol; pt gave good effort    • History of PFTs 01/24/2011    pt gave good effort; spirometry is acceptable and reproducible;    • IBS (irritable bowel syndrome)    • Sarcoidosis     Diagnosed by lymph node biopsy in 2000.  She has well preserved spirometry.   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   • Vulvar dysplasia     VIN3      Past Surgical History:   Procedure Laterality Date   • BREAST BIOPSY     • COLONOSCOPY     • DIAGNOSTIC LAPAROSCOPY EXPLORATORY LAPAROTOMY     • EXCISION LESION  06/27/2016    vaginal WLE   • LYMPH NODE BIOPSY     • MOUTH SURGERY       Social History     Socioeconomic History   • Marital status: Single     Spouse name: Not on file   • Number of children: Not on file   • Years of education: Not on file   • Highest education level: Not on file   Occupational History   • Occupation: retired    Tobacco Use   • Smoking status: Never Smoker   • Smokeless tobacco: Never Used   Substance and Sexual Activity   • Alcohol use: No   • Drug use: No   • Sexual activity: Defer     Comment: single        Current Outpatient Medications:   •  albuterol (PROAIR HFA) 108 (90 Base) MCG/ACT inhaler, Inhale 2 puffs Every 4 (Four) Hours As Needed for Wheezing., Disp: 1 inhaler, Rfl: 11  •  Ascorbic Acid (VITAMIN C) 500 MG capsule, Take  by mouth., Disp: , Rfl:   •  azelastine (ASTELIN) 0.1 % nasal spray, U 2 SPRAYS IEN BID PRF ALLERGIES, Disp: , Rfl: 11  •  Calcium-Magnesium-Vitamin D - MG-MG-UNIT tablet sustained-release 24 hour, Take 1 tablet by mouth Daily., Disp: , Rfl:   •  cetirizine (zyrTEC) 10 MG tablet, Take 10 mg by mouth Daily., Disp: , Rfl:   •  cetirizine-pseudoephedrine (ZYRTEC-D ALLERGY & CONGESTION) 5-120 MG per 12 hr tablet, Take  by mouth., Disp: , Rfl:   •  fluticasone (FLONASE) 50 MCG/ACT nasal spray, into each nostril 2 (two) times a day., Disp: , Rfl:   •  latanoprost (XALATAN)  0.005 % ophthalmic solution, Apply  to eye., Disp: , Rfl:   •  montelukast (SINGULAIR) 10 MG tablet, TK 1 T PO IN THE DEREK, Disp: , Rfl: 11  •  Multiple Vitamin (MULTI-VITAMIN) tablet, Take  by mouth., Disp: , Rfl:   •  Vitamin E 400 UNITS tablet, Take  by mouth., Disp: , Rfl:   •  amoxicillin-clavulanate (AUGMENTIN) 875-125 MG per tablet, Take 1 tablet by mouth 2 (Two) Times a Day., Disp: 14 tablet, Rfl: 0  •  brompheniramine-pseudoephedrine-DM 30-2-10 MG/5ML syrup, Take 5 mL by mouth 4 (Four) Times a Day As Needed for Congestion or Cough., Disp: 120 mL, Rfl: 1     Review of Systems   Constitutional: Negative for chills, fatigue and fever.   HENT: Positive for congestion, postnasal drip and rhinorrhea. Negative for ear pain, sinus pressure and sore throat.    Eyes: Positive for discharge and itching. Negative for pain.   Respiratory: Positive for cough and wheezing. Negative for shortness of breath.    Gastrointestinal: Negative.    Genitourinary: Negative.    Musculoskeletal: Positive for myalgias.   Allergic/Immunologic: Positive for environmental allergies.   Neurological: Positive for light-headedness and headaches. Negative for dizziness.   Hematological: Negative for adenopathy.   Psychiatric/Behavioral: Negative for sleep disturbance.       Objective     Vitals:    10/25/19 1114   BP: 120/80   Pulse: 78   Temp: 97.8 °F (36.6 °C)   SpO2: 98%       Results for orders placed or performed during the hospital encounter of 09/20/19   Duplex Venous Lower Extremity - Right CAR   Result Value Ref Range    Right Common Femoral Spont Y     Right Common Femoral Phasic Y     Right Common Femoral Augment Y     Right Common Femoral Compress C     Right Saphenofemoral Junction Spont Y     Right Saphenofemoral Junction Phasic Y     Right Saphenofemoral Junction Compress C     Right Profunda Femoral Spont Y     Right Profunda Femoral Phasic Y     Right Profunda Femoral Compress C     Right Proximal Femoral Spont Y     Right  Proximal Femoral Phasic Y     Right Proximal Femoral Augment Y     Right Proximal Femoral Compress C     Right Mid Femoral Spont Y     Right Mid Femoral Phasic Y     Right Mid Femoral Augment Y     Right Mid Femoral Compress C     Right Distal Femoral Spont Y     Right Distal Femoral Phasic Y     Right Distal Femoral Augment Y     Right Distal Femoral Compress C     Right Popliteal Spont Y     Right Popliteal Phasic Y     Right Popliteal Augment Y     Right Popliteal Compress C     Right Posterior Tibial Compress C     Right Peroneal Compress C     Right GastronemiusSoleal Compress C     Right Greater Saph AK Compress C     Right Greater Saph BK Compress C     Right Lesser Saph Compress C     Left Common Femoral Spont Y     Left Common Femoral Phasic Y     Left Common Femoral Augment Y     Left Common Femoral Compress C        Physical Exam   Constitutional: She appears well-developed and well-nourished. No distress.   HENT:   Head: Normocephalic and atraumatic.   Right Ear: Hearing and external ear normal. A middle ear effusion is present.   Left Ear: Hearing and external ear normal. A middle ear effusion is present.   Nose: Mucosal edema and rhinorrhea present. Right sinus exhibits maxillary sinus tenderness. Right sinus exhibits no frontal sinus tenderness. Left sinus exhibits maxillary sinus tenderness. Left sinus exhibits no frontal sinus tenderness.   Mouth/Throat: Posterior oropharyngeal erythema present.   Eyes: Pupils are equal, round, and reactive to light. Right conjunctiva is injected. Left conjunctiva is injected.   Neck: Neck supple. No JVD present.   Cardiovascular: Normal rate, regular rhythm and normal heart sounds.   No murmur heard.  Pulmonary/Chest: Effort normal. No stridor. No respiratory distress. She has no wheezes.   Musculoskeletal: She exhibits no edema.   Lymphadenopathy:     She has no cervical adenopathy.   Skin: Skin is warm and dry. She is not diaphoretic.   Psychiatric: She has a  normal mood and affect. Her behavior is normal.   Nursing note and vitals reviewed.      Assessment/Plan   Problem List Items Addressed This Visit     None      Visit Diagnoses     Acute non-recurrent maxillary sinusitis    -  Primary    Relevant Medications    amoxicillin-clavulanate (AUGMENTIN) 875-125 MG per tablet    brompheniramine-pseudoephedrine-DM 30-2-10 MG/5ML syrup      We will give her Augmentin 875/125 twice daily for a week and Bromfed-DM cough syrup.  While she is taking the Bromfed-DM cough syrup she is not to take her Zyrtec-D.  Patient was encouraged to keep me informed of any acute changes, lack of improvement, or any new concerning symptoms.  If patient becomes concerned, or has any worsening symptoms, they are to report either here, urgent treatment, or emergency room. Plan of care discussed with pt. They verbalized understanding and agreement.     RV- PRN    Counseling provided on sinusitis.    Larry Yu, SYLVESTER   10/25/2019   11:59 AM

## 2019-11-13 ENCOUNTER — OFFICE VISIT (OUTPATIENT)
Dept: FAMILY MEDICINE CLINIC | Facility: CLINIC | Age: 64
End: 2019-11-13

## 2019-11-13 ENCOUNTER — APPOINTMENT (OUTPATIENT)
Dept: LAB | Facility: HOSPITAL | Age: 64
End: 2019-11-13

## 2019-11-13 VITALS
BODY MASS INDEX: 33.49 KG/M2 | OXYGEN SATURATION: 98 % | SYSTOLIC BLOOD PRESSURE: 130 MMHG | WEIGHT: 189 LBS | DIASTOLIC BLOOD PRESSURE: 88 MMHG | HEART RATE: 88 BPM | HEIGHT: 63 IN

## 2019-11-13 DIAGNOSIS — J06.9 UPPER RESPIRATORY TRACT INFECTION, UNSPECIFIED TYPE: Primary | ICD-10-CM

## 2019-11-13 DIAGNOSIS — R09.82 POSTNASAL DRIP: ICD-10-CM

## 2019-11-13 DIAGNOSIS — R59.1 LYMPHADENOPATHY: ICD-10-CM

## 2019-11-13 LAB
BASOPHILS # BLD MANUAL: 0.07 10*3/MM3 (ref 0–0.2)
BASOPHILS NFR BLD AUTO: 1 % (ref 0–1.5)
DEPRECATED RDW RBC AUTO: 41.4 FL (ref 37–54)
EOSINOPHIL # BLD MANUAL: 0.15 10*3/MM3 (ref 0–0.4)
EOSINOPHIL NFR BLD MANUAL: 2 % (ref 0.3–6.2)
ERYTHROCYTE [DISTWIDTH] IN BLOOD BY AUTOMATED COUNT: 17.3 % (ref 12.3–15.4)
HCT VFR BLD AUTO: 49.2 % (ref 34–46.6)
HGB BLD-MCNC: 14.5 G/DL (ref 12–15.9)
LYMPHOCYTES # BLD MANUAL: 2.41 10*3/MM3 (ref 0.7–3.1)
LYMPHOCYTES NFR BLD MANUAL: 32.3 % (ref 19.6–45.3)
LYMPHOCYTES NFR BLD MANUAL: 4 % (ref 5–12)
MCH RBC QN AUTO: 21.7 PG (ref 26.6–33)
MCHC RBC AUTO-ENTMCNC: 29.5 G/DL (ref 31.5–35.7)
MCV RBC AUTO: 73.5 FL (ref 79–97)
MONOCYTES # BLD AUTO: 0.3 10*3/MM3 (ref 0.1–0.9)
NEUTROPHILS # BLD AUTO: 4.51 10*3/MM3 (ref 1.7–7)
NEUTROPHILS NFR BLD MANUAL: 60.6 % (ref 42.7–76)
PLAT MORPH BLD: NORMAL
PLATELET # BLD AUTO: 245 10*3/MM3 (ref 140–450)
PMV BLD AUTO: 13.2 FL (ref 6–12)
RBC # BLD AUTO: 6.69 10*6/MM3 (ref 3.77–5.28)
RBC MORPH BLD: NORMAL
WBC MORPH BLD: NORMAL
WBC NRBC COR # BLD: 7.45 10*3/MM3 (ref 3.4–10.8)

## 2019-11-13 PROCEDURE — 85025 COMPLETE CBC W/AUTO DIFF WBC: CPT | Performed by: PHYSICIAN ASSISTANT

## 2019-11-13 PROCEDURE — 99213 OFFICE O/P EST LOW 20 MIN: CPT | Performed by: PHYSICIAN ASSISTANT

## 2019-11-13 PROCEDURE — 85007 BL SMEAR W/DIFF WBC COUNT: CPT | Performed by: PHYSICIAN ASSISTANT

## 2019-11-13 RX ORDER — SACCHAROMYCES BOULARDII 250 MG
250 CAPSULE ORAL 2 TIMES DAILY
Qty: 20 CAPSULE | Refills: 0 | Status: SHIPPED | OUTPATIENT
Start: 2019-11-13 | End: 2019-11-23

## 2019-11-13 RX ORDER — CEFDINIR 300 MG/1
300 CAPSULE ORAL 2 TIMES DAILY
Qty: 20 CAPSULE | Refills: 0 | Status: SHIPPED | OUTPATIENT
Start: 2019-11-13 | End: 2020-02-13

## 2019-11-13 NOTE — PROGRESS NOTES
I have reviewed the notes, assessments, and/or procedures performed by Chetan Gusman, I concur with her/his documentation of Kathy Asher.

## 2019-11-13 NOTE — PROGRESS NOTES
"    Chief Complaint   Patient presents with   • Shoulder Problem     knot came up on rt side of shoulder and neck. noticed monday hard and sore       HPI     Kathy Asher is a pleasant 63 y.o. female who presents for evaluation of \"chief complaint.\" Patient states she felt a knot at the base of her right neck on Monday. It is sore to the touch. She was treated with Augmentin for sinusitis on 10/15. Some throat congestion started to return 1 week ago after completing antibiotics. She stopped her nasal sprays about 1 week ago due to Afrin working better. Denies fever, chills, cough, night sweats, or weight loss. Some fatigue beginning over the summer.     Past Medical History:   Diagnosis Date   • Abnormal finding on lung imaging     CT scan of the chest 06/04/2014 reports interval improvement in the right perihilar masslike opacity and stability in the left perihilar opacity, pulmonary nodules and lymphadenopathy.   • Abnormal Pap smear of cervix    • Allergic rhinitis    • Asthma    • Cervical dysplasia     CIN2   • History of chest x-ray 08/27/2015    interval increase in patchy opacity within the right upper lobe compared to prior study; previously described left mid lung opacity appears similar to the prior study; dextrocurvature of thoracic spine with mild degenerative changes of the spine    • History of chest x-ray 07/09/2012    extensive, masslike and infiltrating disease of the right upper lobe and mild left perihilar disease   • History of chest x-ray 11/17/2010    chest is hyperinflated; infiltrate in right upper lobe apical segment with some associated adenopathy and old granulomatous changes    • History of histoplasmosis    • History of PFTs 06/06/2014    data is acceptable and reproducible; pt given 3 puffs of albuterol; pt gave good effort    • History of PFTs 08/12/2013    data is acceptable and reproducible; pt given 3 puffs of albuterol; pt gave good effort    • History of PFTs 01/24/2011    pt " gave good effort; spirometry is acceptable and reproducible;    • IBS (irritable bowel syndrome)    • Sarcoidosis     Diagnosed by lymph node biopsy in 2000.  She has well preserved spirometry.   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   • Vulvar dysplasia     VIN3       Past Surgical History:   Procedure Laterality Date   • BREAST BIOPSY     • COLONOSCOPY     • DIAGNOSTIC LAPAROSCOPY EXPLORATORY LAPAROTOMY     • EXCISION LESION  06/27/2016    vaginal WLE   • LYMPH NODE BIOPSY     • MOUTH SURGERY         Family History   Problem Relation Age of Onset   • Hyperlipidemia Mother    • Hypertension Mother    • Arthritis Mother    • Heart disease Father    • Diabetes Sister    • Hyperlipidemia Sister    • Diabetes Maternal Grandmother        Social History     Socioeconomic History   • Marital status: Single     Spouse name: Not on file   • Number of children: Not on file   • Years of education: Not on file   • Highest education level: Not on file   Occupational History   • Occupation: retired    Tobacco Use   • Smoking status: Never Smoker   • Smokeless tobacco: Never Used   Substance and Sexual Activity   • Alcohol use: No   • Drug use: No   • Sexual activity: Defer     Comment: single       Allergies   Allergen Reactions   • Ciprofloxacin Other (See Comments)     Mouth felt funny, very dry   • Etodolac Other (See Comments)     Slurred speech        ROS    Review of Systems   Constitutional: Positive for fatigue. Negative for appetite change, chills and fever.   HENT: Positive for congestion and postnasal drip. Negative for sinus pressure and sore throat.    Respiratory: Negative for cough and shortness of breath.    Cardiovascular: Negative for chest pain.       Vitals:    11/13/19 1345   BP: 130/88   Pulse: 88   SpO2: 98%         Current Outpatient Medications:   •  albuterol (PROAIR HFA) 108 (90 Base) MCG/ACT inhaler, Inhale 2 puffs Every 4 (Four) Hours As Needed for Wheezing., Disp: 1 inhaler, Rfl: 11  •   Ascorbic Acid (VITAMIN C) 500 MG capsule, Take  by mouth., Disp: , Rfl:   •  azelastine (ASTELIN) 0.1 % nasal spray, U 2 SPRAYS IEN BID PRF ALLERGIES, Disp: , Rfl: 11  •  brompheniramine-pseudoephedrine-DM 30-2-10 MG/5ML syrup, Take 5 mL by mouth 4 (Four) Times a Day As Needed for Congestion or Cough., Disp: 120 mL, Rfl: 1  •  Calcium-Magnesium-Vitamin D - MG-MG-UNIT tablet sustained-release 24 hour, Take 1 tablet by mouth Daily., Disp: , Rfl:   •  cetirizine (zyrTEC) 10 MG tablet, Take 10 mg by mouth Daily., Disp: , Rfl:   •  cetirizine-pseudoephedrine (ZYRTEC-D ALLERGY & CONGESTION) 5-120 MG per 12 hr tablet, Take  by mouth., Disp: , Rfl:   •  fluticasone (FLONASE) 50 MCG/ACT nasal spray, into each nostril 2 (two) times a day., Disp: , Rfl:   •  latanoprost (XALATAN) 0.005 % ophthalmic solution, Apply  to eye., Disp: , Rfl:   •  montelukast (SINGULAIR) 10 MG tablet, TK 1 T PO IN THE DEREK, Disp: , Rfl: 11  •  Multiple Vitamin (MULTI-VITAMIN) tablet, Take  by mouth., Disp: , Rfl:   •  Vitamin E 400 UNITS tablet, Take  by mouth., Disp: , Rfl:   •  cefdinir (OMNICEF) 300 MG capsule, Take 1 capsule by mouth 2 (Two) Times a Day., Disp: 20 capsule, Rfl: 0    PE    Physical Exam   Constitutional: She appears well-developed and well-nourished. No distress.   HENT:   Head: Normocephalic.   Right Ear: Tympanic membrane and ear canal normal. Tympanic membrane is not erythematous.   Left Ear: Tympanic membrane and ear canal normal. Tympanic membrane is not erythematous.   Nose: Mucosal edema present. Right sinus exhibits no maxillary sinus tenderness and no frontal sinus tenderness. Left sinus exhibits no maxillary sinus tenderness and no frontal sinus tenderness.   Mouth/Throat: Uvula is midline and mucous membranes are normal. Posterior oropharyngeal erythema present. No tonsillar exudate.   Eyes: Conjunctivae are normal. Right eye exhibits no discharge. Left eye exhibits no discharge.   Neck: Normal range of  motion. Neck supple.       Cardiovascular: Normal rate, regular rhythm and normal heart sounds.   Pulmonary/Chest: Effort normal and breath sounds normal. No respiratory distress. She has no wheezes. She has no rhonchi. She has no rales.   Lymphadenopathy:     She has cervical adenopathy.     She has no axillary adenopathy.        Right: No supraclavicular adenopathy present.        Left: No supraclavicular adenopathy present.   Skin: Skin is warm and dry.   Psychiatric: She has a normal mood and affect. Her behavior is normal.   Vitals reviewed.       A/P    Problem List Items Addressed This Visit     None      Visit Diagnoses     Upper respiratory tract infection, unspecified type    -  Primary    Relevant Medications    cefdinir (OMNICEF) 300 MG capsule    Lymphadenopathy      -Probable lymph node. May be reactive given recent hx of URI  -Tx empirically with cefdinir  -Check CBC  -Advised patient follow-up with her PCP for recheck in 10 days   -Consider imaging/biopsy if not resolved on follow-up    Relevant Orders    CBC Auto Differential    Postnasal drip      -Discussed rebound congestion and Afrin use. Avoid using Afrin for more than 2 days  -Advised resuming flonase and azelastine          Plan of care reviewed with patient at the conclusion of today's visit. Education was provided regarding diagnosis, management and any prescribed or recommended OTC medications.  Patient verbalizes understanding of and agreement with management plan.        SHADE Mills

## 2019-11-24 PROBLEM — D75.1 POLYCYTHEMIA: Status: ACTIVE | Noted: 2019-11-24

## 2019-11-27 ENCOUNTER — OFFICE VISIT (OUTPATIENT)
Dept: FAMILY MEDICINE CLINIC | Facility: CLINIC | Age: 64
End: 2019-11-27

## 2019-11-27 VITALS
DIASTOLIC BLOOD PRESSURE: 80 MMHG | BODY MASS INDEX: 33.49 KG/M2 | OXYGEN SATURATION: 98 % | TEMPERATURE: 97.6 F | HEART RATE: 85 BPM | WEIGHT: 189 LBS | HEIGHT: 63 IN | SYSTOLIC BLOOD PRESSURE: 120 MMHG

## 2019-11-27 DIAGNOSIS — R59.1 LYMPHADENOPATHY: Primary | ICD-10-CM

## 2019-11-27 PROCEDURE — 99213 OFFICE O/P EST LOW 20 MIN: CPT | Performed by: NURSE PRACTITIONER

## 2019-11-27 NOTE — PROGRESS NOTES
Chief Complaint   Patient presents with   • lymphadema     She still notices the swollen lymph node, she reports that is has gotten smaller but it still causes pain        Subjective   Kathy Asher is a 63 y.o. female    History of Present Illness  Patient today with lymphadenopathy.  She was seen by 1 of my partners on 11/13/2019 not the base of her right neck on the Monday prior to that.  It was sore to the touch.  She had been treated for sinusitis on 10/15/2019 and some versus congestion had returned about a week after completing her antibiotics that time.  When she was seen on 11/13/2019 she was given some Omnicef he recommended using Flonase and azelastine and patient is using that.  Today she states her lymph node is still there but it is smaller.  She did have a CBC on 11/13/2019 which showed normal WBCs but her red blood cells were elevated at 6.69 with H&H of 14.5 and 49.2.    Allergies   Allergen Reactions   • Ciprofloxacin Other (See Comments)     Mouth felt funny, very dry   • Etodolac Other (See Comments)     Slurred speech      Past Medical History:   Diagnosis Date   • Abnormal finding on lung imaging     CT scan of the chest 06/04/2014 reports interval improvement in the right perihilar masslike opacity and stability in the left perihilar opacity, pulmonary nodules and lymphadenopathy.   • Abnormal Pap smear of cervix    • Allergic rhinitis    • Asthma    • Cervical dysplasia     CIN2   • History of chest x-ray 08/27/2015    interval increase in patchy opacity within the right upper lobe compared to prior study; previously described left mid lung opacity appears similar to the prior study; dextrocurvature of thoracic spine with mild degenerative changes of the spine    • History of chest x-ray 07/09/2012    extensive, masslike and infiltrating disease of the right upper lobe and mild left perihilar disease   • History of chest x-ray 11/17/2010    chest is hyperinflated; infiltrate in right upper  lobe apical segment with some associated adenopathy and old granulomatous changes    • History of histoplasmosis    • History of PFTs 06/06/2014    data is acceptable and reproducible; pt given 3 puffs of albuterol; pt gave good effort    • History of PFTs 08/12/2013    data is acceptable and reproducible; pt given 3 puffs of albuterol; pt gave good effort    • History of PFTs 01/24/2011    pt gave good effort; spirometry is acceptable and reproducible;    • IBS (irritable bowel syndrome)    • Sarcoidosis     Diagnosed by lymph node biopsy in 2000.  She has well preserved spirometry.   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   • Vulvar dysplasia     VIN3      Past Surgical History:   Procedure Laterality Date   • BREAST BIOPSY     • COLONOSCOPY     • DIAGNOSTIC LAPAROSCOPY EXPLORATORY LAPAROTOMY     • EXCISION LESION  06/27/2016    vaginal WLE   • LYMPH NODE BIOPSY     • MOUTH SURGERY       Social History     Socioeconomic History   • Marital status: Single     Spouse name: Not on file   • Number of children: Not on file   • Years of education: Not on file   • Highest education level: Not on file   Occupational History   • Occupation: retired    Tobacco Use   • Smoking status: Never Smoker   • Smokeless tobacco: Never Used   Substance and Sexual Activity   • Alcohol use: No   • Drug use: No   • Sexual activity: Defer     Comment: single        Current Outpatient Medications:   •  albuterol (PROAIR HFA) 108 (90 Base) MCG/ACT inhaler, Inhale 2 puffs Every 4 (Four) Hours As Needed for Wheezing., Disp: 1 inhaler, Rfl: 11  •  Ascorbic Acid (VITAMIN C) 500 MG capsule, Take  by mouth., Disp: , Rfl:   •  azelastine (ASTELIN) 0.1 % nasal spray, U 2 SPRAYS IEN BID PRF ALLERGIES, Disp: , Rfl: 11  •  brompheniramine-pseudoephedrine-DM 30-2-10 MG/5ML syrup, Take 5 mL by mouth 4 (Four) Times a Day As Needed for Congestion or Cough., Disp: 120 mL, Rfl: 1  •  Calcium-Magnesium-Vitamin D - MG-MG-UNIT tablet  sustained-release 24 hour, Take 1 tablet by mouth Daily., Disp: , Rfl:   •  cefdinir (OMNICEF) 300 MG capsule, Take 1 capsule by mouth 2 (Two) Times a Day., Disp: 20 capsule, Rfl: 0  •  cetirizine (zyrTEC) 10 MG tablet, Take 10 mg by mouth Daily., Disp: , Rfl:   •  cetirizine-pseudoephedrine (ZYRTEC-D ALLERGY & CONGESTION) 5-120 MG per 12 hr tablet, Take  by mouth., Disp: , Rfl:   •  fluticasone (FLONASE) 50 MCG/ACT nasal spray, into each nostril 2 (two) times a day., Disp: , Rfl:   •  latanoprost (XALATAN) 0.005 % ophthalmic solution, Apply  to eye., Disp: , Rfl:   •  Latanoprost 0.005 % emulsion, latanoprost 0.005 % eye drops  INSTILL 1 DROP IN THE RIGHT EYE EVERY NIGHT AT BEDTIME AS DIRECTED, Disp: , Rfl:   •  montelukast (SINGULAIR) 10 MG tablet, TK 1 T PO IN THE DEREK, Disp: , Rfl: 11  •  Multiple Vitamin (MULTI-VITAMIN) tablet, Take  by mouth., Disp: , Rfl:   •  Vitamin E 400 UNITS tablet, Take  by mouth., Disp: , Rfl:      Review of Systems   Constitutional: Negative for chills, fatigue and unexpected weight change.   Respiratory: Negative for cough, chest tightness, shortness of breath and wheezing.    Cardiovascular: Negative for chest pain, palpitations and leg swelling.   Gastrointestinal: Negative for constipation, diarrhea, nausea and vomiting.   Genitourinary: Negative for difficulty urinating and dysuria.   Skin: Negative for color change and rash.   Neurological: Negative for dizziness, syncope, weakness and headaches.   Psychiatric/Behavioral: Negative for sleep disturbance.       Objective     Vitals:    11/27/19 1056   BP: 120/80   Pulse: 85   Temp: 97.6 °F (36.4 °C)   SpO2: 98%       Results for orders placed or performed in visit on 11/13/19   CBC Auto Differential   Result Value Ref Range    WBC 7.45 3.40 - 10.80 10*3/mm3    RBC 6.69 (H) 3.77 - 5.28 10*6/mm3    Hemoglobin 14.5 12.0 - 15.9 g/dL    Hematocrit 49.2 (H) 34.0 - 46.6 %    MCV 73.5 (L) 79.0 - 97.0 fL    MCH 21.7 (L) 26.6 - 33.0 pg     MCHC 29.5 (L) 31.5 - 35.7 g/dL    RDW 17.3 (H) 12.3 - 15.4 %    RDW-SD 41.4 37.0 - 54.0 fl    MPV 13.2 (H) 6.0 - 12.0 fL    Platelets 245 140 - 450 10*3/mm3   Manual Differential   Result Value Ref Range    Neutrophil % 60.6 42.7 - 76.0 %    Lymphocyte % 32.3 19.6 - 45.3 %    Monocyte % 4.0 (L) 5.0 - 12.0 %    Eosinophil % 2.0 0.3 - 6.2 %    Basophil % 1.0 0.0 - 1.5 %    Neutrophils Absolute 4.51 1.70 - 7.00 10*3/mm3    Lymphocytes Absolute 2.41 0.70 - 3.10 10*3/mm3    Monocytes Absolute 0.30 0.10 - 0.90 10*3/mm3    Eosinophils Absolute 0.15 0.00 - 0.40 10*3/mm3    Basophils Absolute 0.07 0.00 - 0.20 10*3/mm3    RBC Morphology Normal Normal    WBC Morphology Normal Normal    Platelet Morphology Normal Normal       Physical Exam   Constitutional: She is oriented to person, place, and time. She appears well-developed and well-nourished.   HENT:   Head: Normocephalic and atraumatic.   Cardiovascular: Normal rate, regular rhythm and normal heart sounds.   Pulmonary/Chest: Effort normal and breath sounds normal.   Musculoskeletal: She exhibits no edema.   Lymphadenopathy:     She has cervical adenopathy.        Right cervical: Superficial cervical ( smaller than prior) adenopathy present.   Neurological: She is alert and oriented to person, place, and time.   Skin: Skin is warm and dry.   Psychiatric: She has a normal mood and affect. Her behavior is normal.   Vitals reviewed.      Assessment/Plan   Problem List Items Addressed This Visit     None      Visit Diagnoses     Lymphadenopathy    -  Primary      Improving lymphadenopathy, with normal white blood cells and polycythemia.  We will recheck CBC on Monday.  She has been instructed to increase her fluid intake over the weekend. Patient was encouraged to keep me informed of any acute changes, lack of improvement, or any new concerning symptoms.  If patient becomes concerned, or has any worsening symptoms, they are to report either here, urgent treatment, or emergency  room. Plan of care discussed with pt. They verbalized understanding and agreement.     RV- PRN or sooner    Counseling provided on Lymph node.    Larry Yu, APRN   11/27/2019   11:24 AM

## 2019-11-27 NOTE — PATIENT INSTRUCTIONS
Complete Blood Count  Why am I having this test?  A complete blood count (CBC) is a blood test that measures the cells of your blood. The types of cells are red blood cells (RBCs), white blood cells (WBCs), and blood cell fragments that help with clotting (platelets). Changes in these cells can warn your health care provider about many conditions, including anemia, infection, inflammation, bleeding, and blood-related cancer.  You may have this test:  · As part of a routine physical exam.  · To help your health care provider diagnose certain health conditions.  · To help your health care provider monitor known health conditions or treatments.  What is being tested?  A CBC measures your RBCs, WBCs, platelets, and their different components. RBCs are made in the tissue inside your bones (bone marrow) and released into your blood. These cells contain a protein that carries oxygen in your blood (hemoglobin). CBC testing of RBCs includes:  · RBC count. This is the total number of RBCs.  · Hemoglobin (Hb). This is the total amount of hemoglobin.  · Hematocrit (Hct). This is the percentage of space that red blood cells take up in your blood.  · Mean corpuscular volume (MCV). This is the average size of red blood cells.  · Mean corpuscular hemoglobin (MCH). This is the average amount of hemoglobin inside each red blood cell.  · Mean corpuscular hemoglobin concentration (MCHC). This is the average concentration of hemoglobin inside each red blood cell.  · Red blood cell distribution width (RDW). This may be included to measure the variation in the size of red blood cells.  WBCs are also made in your bone marrow. They are part of your body's disease-fighting system (immune system). CBC testing of WBCs includes:  · WBC count. This is the total number of WBCs.  · A count of the five types of WBCs:  ? Neutrophils.  ? Lymphocytes.  ? Monocytes.  ? Eosinophils.  ? Basophils.  Platelets are cell fragments that are important for  blood clotting. A CBC measures:  · Total platelet count.  · Mean platelet volume (MPV).  What kind of sample is taken?    A blood sample is required for this test. It is usually collected by inserting a needle into a blood vessel.  How are the results reported?  Your test results will be reported as values. Your health care provider will compare your results to normal ranges that were established after testing a large group of people (reference ranges). Reference ranges may vary among labs and hospitals. Reference ranges for a CBC usually apply only to people who are older than 18. For this test, common reference ranges for people older than 18 may be:  Red blood cells  · RBC count  ? Men: 4.7-6.1  ? Women: 4.2-5.4  · Hb  ? Men: 14-18  ? Women: 12-16  · Hct  ? Men: 42-52%  ? Women: 37-47%  · MCV: 80-95  · MCH: 27-31  · MCHC: 32-36  · RDW: 11-14.5%  White blood cells  · WBC count: 5,000-10,000  · Neutrophil count: 6491-4346 or 55-70%  · Lymphocyte count: 4247-7082 or 20-40%  · Monocyte count: 100-700 or 2-8%  · Eosinophil count:  or 1-4%  · Basophil count:  or 0.5-1%  Platelets  · Platelet count: 150,000-400,000  · MPV: 7.4-10.4  What do the results mean?  Results that are outside the reference ranges may indicate that you have an infection or other health condition.  Red blood cells  · RBC count, Hb, and Hct  ? Results lower than the reference ranges may indicate anemia. Anemia may be caused by several conditions, such as iron deficiency anemia.  ? Results higher than the reference ranges may indicate polycythemia. This may be caused by several conditions, such as chronic obstructive pulmonary disease (COPD).  · MCV, MCH, MCHC, and RDW  ? Results outside the reference ranges may indicate a condition that causes anemia.  White blood cells  · WBC count  ? Results lower than the reference range may indicate an infection or a condition that keeps your bone marrow from making new WBCs.  ? Results higher than  the reference range may indicate an infection, a condition that causes inflammation (autoimmune disease), or a blood-related cancer.  · Neutrophils, lymphocytes, and monocytes  ? Results outside the reference ranges may indicate an infection, an autoimmune disease, or certain types of cancer.  · Eosinophils and basophils  ? Results outside the reference ranges may indicate an allergy, an inflammatory condition such as asthma, or blood cell cancer.  Platelets  · Platelet count  ? Results lower than the reference range may indicate an infection or blood cell cancer.  ? Results higher than the reference range may indicate certain types of anemia, an autoimmune disease, or certain cancers.  · MPV  ? High or low results may indicate a bone marrow abnormality.  Talk with your health care provider about what your results mean.  Questions to ask your health care provider  Ask your health care provider or the department that is doing the test:  · When will my results be ready?  · How will I get my results?  · What are my treatment options?  · What other tests do I need?  · What are my next steps?  Summary  · A CBC is a routine blood test that measures the cells in your blood.  · Changes in these cells can indicate many conditions, including anemia, infection, inflammation, and blood cell cancer.  · A health care provider will collect a sample of your blood for this test by inserting a needle into a blood vessel.  · Talk with your health care provider about what your results mean.  This information is not intended to replace advice given to you by your health care provider. Make sure you discuss any questions you have with your health care provider.  Document Released: 01/20/2006 Document Revised: 12/18/2018 Document Reviewed: 12/18/2018  [employer] Patient Education © 2019 [employer]

## 2019-12-04 ENCOUNTER — LAB (OUTPATIENT)
Dept: LAB | Facility: HOSPITAL | Age: 64
End: 2019-12-04

## 2019-12-04 DIAGNOSIS — R59.1 LYMPHADENOPATHY: ICD-10-CM

## 2019-12-04 LAB
BASOPHILS # BLD AUTO: 0.08 10*3/MM3 (ref 0–0.2)
BASOPHILS NFR BLD AUTO: 1.3 % (ref 0–1.5)
DEPRECATED RDW RBC AUTO: 40.5 FL (ref 37–54)
EOSINOPHIL # BLD AUTO: 0.13 10*3/MM3 (ref 0–0.4)
EOSINOPHIL NFR BLD AUTO: 2.1 % (ref 0.3–6.2)
ERYTHROCYTE [DISTWIDTH] IN BLOOD BY AUTOMATED COUNT: 16.8 % (ref 12.3–15.4)
HCT VFR BLD AUTO: 46.7 % (ref 34–46.6)
HGB BLD-MCNC: 14.3 G/DL (ref 12–15.9)
LYMPHOCYTES # BLD AUTO: 1.18 10*3/MM3 (ref 0.7–3.1)
LYMPHOCYTES NFR BLD AUTO: 18.6 % (ref 19.6–45.3)
MCH RBC QN AUTO: 22.2 PG (ref 26.6–33)
MCHC RBC AUTO-ENTMCNC: 30.6 G/DL (ref 31.5–35.7)
MCV RBC AUTO: 72.6 FL (ref 79–97)
MONOCYTES # BLD AUTO: 0.57 10*3/MM3 (ref 0.1–0.9)
MONOCYTES NFR BLD AUTO: 9 % (ref 5–12)
NEUTROPHILS # BLD AUTO: 4.35 10*3/MM3 (ref 1.7–7)
NEUTROPHILS NFR BLD AUTO: 68.7 % (ref 42.7–76)
PLATELET # BLD AUTO: 246 10*3/MM3 (ref 140–450)
PMV BLD AUTO: 11.9 FL (ref 6–12)
RBC # BLD AUTO: 6.43 10*6/MM3 (ref 3.77–5.28)
WBC NRBC COR # BLD: 6.33 10*3/MM3 (ref 3.4–10.8)

## 2019-12-04 PROCEDURE — 85025 COMPLETE CBC W/AUTO DIFF WBC: CPT

## 2019-12-09 ENCOUNTER — TELEPHONE (OUTPATIENT)
Dept: FAMILY MEDICINE CLINIC | Facility: CLINIC | Age: 64
End: 2019-12-09

## 2019-12-09 NOTE — TELEPHONE ENCOUNTER
Called and informed pt about labs results. Pt states that lyubov told her just to drink more water, wants to know if there is anything else she needs to do

## 2019-12-09 NOTE — TELEPHONE ENCOUNTER
See letter dated 12/5/19.    The test results show that your increased number of red cells in your blood is unchanged from previous levels checked. If you have any questions or concerns, please don't hesitate to call.

## 2019-12-09 NOTE — TELEPHONE ENCOUNTER
Patient called requesting her lab results that she had performed on 12/4/19. Please call the patient back on her mobile phone at [phone] when this message has been received and her lab results have been read, reviewed, and are ready to release.

## 2019-12-10 NOTE — TELEPHONE ENCOUNTER
She does have mild polycythemia, which is elevated number of red blood cells.  Most common cause is dehydration.  This is unchanged from her last years labs and is mild in nature.   If she has a hematologist she sees I will send these labs to them.

## 2019-12-11 NOTE — TELEPHONE ENCOUNTER
Spoke with patient and informed her of Larry PHAN notes. She did not need her labs sent to another physician but will continue to increase her water intake.

## 2020-02-13 ENCOUNTER — OFFICE VISIT (OUTPATIENT)
Dept: FAMILY MEDICINE CLINIC | Facility: CLINIC | Age: 65
End: 2020-02-13

## 2020-02-13 VITALS
TEMPERATURE: 96.3 F | OXYGEN SATURATION: 98 % | HEIGHT: 63 IN | SYSTOLIC BLOOD PRESSURE: 116 MMHG | HEART RATE: 81 BPM | BODY MASS INDEX: 33.49 KG/M2 | WEIGHT: 189 LBS | DIASTOLIC BLOOD PRESSURE: 80 MMHG

## 2020-02-13 DIAGNOSIS — J06.9 ACUTE URI: ICD-10-CM

## 2020-02-13 DIAGNOSIS — Z23 NEED FOR IMMUNIZATION AGAINST INFLUENZA: ICD-10-CM

## 2020-02-13 DIAGNOSIS — M25.511 ACUTE PAIN OF RIGHT SHOULDER: Primary | ICD-10-CM

## 2020-02-13 PROCEDURE — 90686 IIV4 VACC NO PRSV 0.5 ML IM: CPT | Performed by: NURSE PRACTITIONER

## 2020-02-13 PROCEDURE — 99213 OFFICE O/P EST LOW 20 MIN: CPT | Performed by: NURSE PRACTITIONER

## 2020-02-13 PROCEDURE — G0008 ADMIN INFLUENZA VIRUS VAC: HCPCS | Performed by: NURSE PRACTITIONER

## 2020-02-13 RX ORDER — LATANOPROST 50 UG/ML
SOLUTION/ DROPS OPHTHALMIC
COMMUNITY
End: 2020-02-13 | Stop reason: SDUPTHER

## 2020-02-13 RX ORDER — MELOXICAM 7.5 MG/1
7.5 TABLET ORAL DAILY
Qty: 30 TABLET | Refills: 1 | Status: SHIPPED | OUTPATIENT
Start: 2020-02-13 | End: 2020-08-14

## 2020-02-13 RX ORDER — BROMPHENIRAMINE MALEATE, PSEUDOEPHEDRINE HYDROCHLORIDE, AND DEXTROMETHORPHAN HYDROBROMIDE 2; 30; 10 MG/5ML; MG/5ML; MG/5ML
5 SYRUP ORAL 4 TIMES DAILY PRN
Qty: 120 ML | Refills: 1 | Status: SHIPPED | OUTPATIENT
Start: 2020-02-13 | End: 2020-08-14

## 2020-02-13 NOTE — PROGRESS NOTES
Chief Complaint   Patient presents with   • Back Pain     She has pain along her shoulderblades. She describes the pain as if she is being poked in the back constantly.    • Sinusitis     She has been feeling sick for the past week or so, she is experiencing coughing sneezing, producing yellow mucus, and headaches        Subjective   Kathy Asher is a 64 y.o. female    History of Present Illness  Patient in with complaints of back pain.  The pain is in the right area between her scapula and her spine.  Stabbing-like pain that is been there off and on for the past few months now has been constant for the past couple weeks.  Pain is about a 5-6/10.  She has not taken anything for the pain or used heat or ice.  She has not really done any other co-treatments for the pain.    She also has complaints of upper respiratory symptoms for the past week or so.  She is coughing-occasional pale yellow, sneezing and headaches.  She did have some sinus tenderness at first but this has improved.  She does have yellow sinus drainage with some blood at times.  Her left ear always feels like cotton or fullness in it.  Her throat was getting some soreness to it but she started gargling and that has improved.  She has taken Advil and Robitussin for the upper respiratory symptoms.  It has helped her some.  She has had some wheezing and had to use her albuterol inhaler every night.  She does use azelastine nasal spray, she also has been using Afrin nasal spray.  She has been out of Flonase.  She does take Singulair.      Allergies   Allergen Reactions   • Ciprofloxacin Other (See Comments)     Mouth felt funny, very dry   • Etodolac Other (See Comments)     Slurred speech      Past Medical History:   Diagnosis Date   • Abnormal finding on lung imaging     CT scan of the chest 06/04/2014 reports interval improvement in the right perihilar masslike opacity and stability in the left perihilar opacity, pulmonary nodules and  lymphadenopathy.   • Abnormal Pap smear of cervix    • Allergic rhinitis    • Asthma    • Cervical dysplasia     CIN2   • History of chest x-ray 08/27/2015    interval increase in patchy opacity within the right upper lobe compared to prior study; previously described left mid lung opacity appears similar to the prior study; dextrocurvature of thoracic spine with mild degenerative changes of the spine    • History of chest x-ray 07/09/2012    extensive, masslike and infiltrating disease of the right upper lobe and mild left perihilar disease   • History of chest x-ray 11/17/2010    chest is hyperinflated; infiltrate in right upper lobe apical segment with some associated adenopathy and old granulomatous changes    • History of histoplasmosis    • History of PFTs 06/06/2014    data is acceptable and reproducible; pt given 3 puffs of albuterol; pt gave good effort    • History of PFTs 08/12/2013    data is acceptable and reproducible; pt given 3 puffs of albuterol; pt gave good effort    • History of PFTs 01/24/2011    pt gave good effort; spirometry is acceptable and reproducible;    • IBS (irritable bowel syndrome)    • Sarcoidosis     Diagnosed by lymph node biopsy in 2000.  She has well preserved spirometry.   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   • Vulvar dysplasia     VIN3      Past Surgical History:   Procedure Laterality Date   • BREAST BIOPSY     • COLONOSCOPY     • DIAGNOSTIC LAPAROSCOPY EXPLORATORY LAPAROTOMY     • EXCISION LESION  06/27/2016    vaginal WLE   • LYMPH NODE BIOPSY     • MOUTH SURGERY       Social History     Socioeconomic History   • Marital status: Single     Spouse name: Not on file   • Number of children: Not on file   • Years of education: Not on file   • Highest education level: Not on file   Occupational History   • Occupation: retired    Tobacco Use   • Smoking status: Never Smoker   • Smokeless tobacco: Never Used   Substance and Sexual Activity   • Alcohol use: No   • Drug  use: No   • Sexual activity: Defer     Comment: single        Current Outpatient Medications:   •  albuterol (PROAIR HFA) 108 (90 Base) MCG/ACT inhaler, Inhale 2 puffs Every 4 (Four) Hours As Needed for Wheezing., Disp: 1 inhaler, Rfl: 11  •  Ascorbic Acid (VITAMIN C) 500 MG capsule, Take  by mouth., Disp: , Rfl:   •  azelastine (ASTELIN) 0.1 % nasal spray, U 2 SPRAYS IEN BID PRF ALLERGIES, Disp: , Rfl: 11  •  brompheniramine-pseudoephedrine-DM 30-2-10 MG/5ML syrup, Take 5 mL by mouth 4 (Four) Times a Day As Needed for Congestion or Cough., Disp: 120 mL, Rfl: 1  •  Calcium-Magnesium-Vitamin D - MG-MG-UNIT tablet sustained-release 24 hour, Take 1 tablet by mouth Daily., Disp: , Rfl:   •  cetirizine (zyrTEC) 10 MG tablet, Take 10 mg by mouth Daily., Disp: , Rfl:   •  cetirizine-pseudoephedrine (ZYRTEC-D ALLERGY & CONGESTION) 5-120 MG per 12 hr tablet, Take  by mouth., Disp: , Rfl:   •  fluticasone (FLONASE) 50 MCG/ACT nasal spray, into each nostril 2 (two) times a day., Disp: , Rfl:   •  latanoprost (XALATAN) 0.005 % ophthalmic solution, Apply  to eye., Disp: , Rfl:   •  montelukast (SINGULAIR) 10 MG tablet, TK 1 T PO IN THE DEREK, Disp: , Rfl: 11  •  Multiple Vitamin (MULTI-VITAMIN) tablet, Take  by mouth., Disp: , Rfl:   •  Vitamin E 400 UNITS tablet, Take  by mouth., Disp: , Rfl:   •  meloxicam (MOBIC) 7.5 MG tablet, Take 1 tablet by mouth Daily., Disp: 30 tablet, Rfl: 1     Review of Systems   Constitutional: Negative for chills, fatigue and unexpected weight change.   HENT: Positive for congestion, rhinorrhea, sinus pressure and sore throat.    Eyes: Positive for discharge and itching.   Respiratory: Positive for cough, shortness of breath and wheezing. Negative for chest tightness.    Cardiovascular: Negative for chest pain, palpitations and leg swelling.   Gastrointestinal: Negative for constipation, diarrhea, nausea and vomiting.   Genitourinary: Negative for difficulty urinating and dysuria.    Musculoskeletal: Positive for arthralgias (right scapular region).   Skin: Negative for color change and rash.   Allergic/Immunologic: Positive for environmental allergies.   Neurological: Negative for dizziness, syncope, weakness and headaches.   Psychiatric/Behavioral: Positive for sleep disturbance (some).       Objective     Vitals:    02/13/20 1503   BP: 116/80   Pulse: 81   Temp: 96.3 °F (35.7 °C)   SpO2: 98%       Results for orders placed or performed in visit on 12/04/19   CBC Auto Differential   Result Value Ref Range    WBC 6.33 3.40 - 10.80 10*3/mm3    RBC 6.43 (H) 3.77 - 5.28 10*6/mm3    Hemoglobin 14.3 12.0 - 15.9 g/dL    Hematocrit 46.7 (H) 34.0 - 46.6 %    MCV 72.6 (L) 79.0 - 97.0 fL    MCH 22.2 (L) 26.6 - 33.0 pg    MCHC 30.6 (L) 31.5 - 35.7 g/dL    RDW 16.8 (H) 12.3 - 15.4 %    RDW-SD 40.5 37.0 - 54.0 fl    MPV 11.9 6.0 - 12.0 fL    Platelets 246 140 - 450 10*3/mm3    Neutrophil % 68.7 42.7 - 76.0 %    Lymphocyte % 18.6 (L) 19.6 - 45.3 %    Monocyte % 9.0 5.0 - 12.0 %    Eosinophil % 2.1 0.3 - 6.2 %    Basophil % 1.3 0.0 - 1.5 %    Neutrophils, Absolute 4.35 1.70 - 7.00 10*3/mm3    Lymphocytes, Absolute 1.18 0.70 - 3.10 10*3/mm3    Monocytes, Absolute 0.57 0.10 - 0.90 10*3/mm3    Eosinophils, Absolute 0.13 0.00 - 0.40 10*3/mm3    Basophils, Absolute 0.08 0.00 - 0.20 10*3/mm3       Physical Exam   Constitutional: She is oriented to person, place, and time. She appears well-developed and well-nourished. No distress.   HENT:   Head: Normocephalic and atraumatic.   Right Ear: Hearing and external ear normal. A middle ear effusion is present.   Left Ear: Hearing and external ear normal. A middle ear effusion is present.   Nose: Mucosal edema and rhinorrhea present. Right sinus exhibits no maxillary sinus tenderness and no frontal sinus tenderness. Left sinus exhibits no maxillary sinus tenderness and no frontal sinus tenderness.   Mouth/Throat: Posterior oropharyngeal erythema present.   Eyes: Pupils  are equal, round, and reactive to light.   Neck: Neck supple. No JVD present.   Cardiovascular: Normal rate, regular rhythm and normal heart sounds.   No murmur heard.  Pulmonary/Chest: Effort normal. No stridor. No respiratory distress. She has no wheezes.   Musculoskeletal: She exhibits tenderness (at medial edge of right scapula with palpation, with full ROM, no decreased strength). She exhibits no edema.   Lymphadenopathy:     She has no cervical adenopathy.   Neurological: She is alert and oriented to person, place, and time.   Skin: Skin is warm and dry. She is not diaphoretic.   Psychiatric: She has a normal mood and affect. Her behavior is normal.   Nursing note and vitals reviewed.      Assessment/Plan   Problem List Items Addressed This Visit     None      Visit Diagnoses     Acute pain of right shoulder    -  Primary    Relevant Medications    meloxicam (MOBIC) 7.5 MG tablet    Acute URI        Relevant Medications    brompheniramine-pseudoephedrine-DM 30-2-10 MG/5ML syrup    Need for immunization against influenza        Relevant Orders    Fluarix/Fluzone/Afluria Quad>6 Months (Completed)      Right shoulder pain we will go started on meloxicam 7.5 mg daily with food.  She has taken ibuprofen in the past and been fine with this.  Muscular have her put heat to her shoulder blade area alternating with ice and I will give her some exercises to do.     For upper respiratory symptoms she is having I recommend Bromfed-DM cough syrup. Patient was encouraged to keep me informed of any acute changes, lack of improvement, or any new concerning symptoms.  If patient becomes concerned, or has any worsening symptoms, they are to report either here, urgent treatment, or emergency room. Plan of care discussed with pt. They verbalized understanding and agreement.     RV- 1 mo    Counseling provided on Shoulder exercises.    Larry Yu, APRN   2/13/2020   4:59 PM

## 2020-02-13 NOTE — PATIENT INSTRUCTIONS
Shoulder Exercises  Ask your health care provider which exercises are safe for you. Do exercises exactly as told by your health care provider and adjust them as directed. It is normal to feel mild stretching, pulling, tightness, or discomfort as you do these exercises, but you should stop right away if you feel sudden pain or your pain gets worse. Do not begin these exercises until told by your health care provider.  Range of Motion Exercises              These exercises warm up your muscles and joints and improve the movement and flexibility of your shoulder. These exercises also help to relieve pain, numbness, and tingling. These exercises involve stretching your injured shoulder directly.  Exercise A: Pendulum  1. Stand near a wall or a surface that you can hold onto for balance.  2. Bend at the waist and let your left / right arm hang straight down. Use your other arm to support you. Keep your back straight and do not lock your knees.  3. Relax your left / right arm and shoulder muscles, and move your hips and your trunk so your left / right arm swings freely. Your arm should swing because of the motion of your body, not because you are using your arm or shoulder muscles.  4. Keep moving your body so your arm swings in the following directions, as told by your health care provider:  ? Side to side.  ? Forward and backward.  ? In clockwise and counterclockwise circles.  5. Continue each motion for __________ seconds, or for as long as told by your health care provider.  6. Slowly return to the starting position.  Repeat __________ times. Complete this exercise __________ times a day.  Exercise B: Flexion, Standing  1. Stand and hold a broomstick, a cane, or a similar object. Place your hands a little more than shoulder-width apart on the object. Your left / right hand should be palm-up, and your other hand should be palm-down.  2. Keep your elbow straight and keep your shoulder muscles relaxed. Push the stick  down with your healthy arm to raise your left / right arm in front of your body, and then over your head until you feel a stretch in your shoulder.  ? Avoid shrugging your shoulder while you raise your arm. Keep your shoulder blade tucked down toward the middle of your back.  3. Hold for __________ seconds.  4. Slowly return to the starting position.  Repeat __________ times. Complete this exercise __________ times a day.  Exercise C: Abduction, Standing  1. Stand and hold a broomstick, a cane, or a similar object. Place your hands a little more than shoulder-width apart on the object. Your left / right hand should be palm-up, and your other hand should be palm-down.  2. While keeping your elbow straight and your shoulder muscles relaxed, push the stick across your body toward your left / right side. Raise your left / right arm to the side of your body and then over your head until you feel a stretch in your shoulder.  ? Do not raise your arm above shoulder height, unless your health care provider tells you to do that.  ? Avoid shrugging your shoulder while you raise your arm. Keep your shoulder blade tucked down toward the middle of your back.  3. Hold for __________ seconds.  4. Slowly return to the starting position.  Repeat __________ times. Complete this exercise __________ times a day.  Exercise D: Internal Rotation  1. Place your left / right hand behind your back, palm-up.  2. Use your other hand to dangle an exercise band, a towel, or a similar object over your shoulder. Grasp the band with your left / right hand so you are holding onto both ends.  3. Gently pull up on the band until you feel a stretch in the front of your left / right shoulder.  ? Avoid shrugging your shoulder while you raise your arm. Keep your shoulder blade tucked down toward the middle of your back.  4. Hold for __________ seconds.  5. Release the stretch by letting go of the band and lowering your hands.  Repeat __________ times.  Complete this exercise __________ times a day.  Stretching Exercises    These exercises warm up your muscles and joints and improve the movement and flexibility of your shoulder. These exercises also help to relieve pain, numbness, and tingling. These exercises are done using your healthy shoulder to help stretch the muscles of your injured shoulder.  Exercise E: Corner Stretch (External Rotation and Abduction)  1.  a doorway with one of your feet slightly in front of the other. This is called a staggered stance. If you cannot reach your forearms to the door frame, stand facing a corner of a room.  2. Choose one of the following positions as told by your health care provider:  ? Place your hands and forearms on the door frame above your head.  ? Place your hands and forearms on the door frame at the height of your head.  ? Place your hands on the door frame at the height of your elbows.  3. Slowly move your weight onto your front foot until you feel a stretch across your chest and in the front of your shoulders. Keep your head and chest upright and keep your abdominal muscles tight.  4. Hold for __________ seconds.  5. To release the stretch, shift your weight to your back foot.  Repeat __________ times. Complete this stretch __________ times a day.  Exercise F: Extension, Standing  1. Stand and hold a broomstick, a cane, or a similar object behind your back.  ? Your hands should be a little wider than shoulder-width apart.  ? Your palms should face away from your back.  2. Keeping your elbows straight and keeping your shoulder muscles relaxed, move the stick away from your body until you feel a stretch in your shoulder.  ? Avoid shrugging your shoulders while you move the stick. Keep your shoulder blade tucked down toward the middle of your back.  3. Hold for __________ seconds.  4. Slowly return to the starting position.  Repeat __________ times. Complete this exercise __________ times a  day.  Strengthening Exercises                   These exercises build strength and endurance in your shoulder. Endurance is the ability to use your muscles for a long time, even after they get tired.  Exercise G: External Rotation  1. Sit in a stable chair without armrests.  2. Secure an exercise band at elbow height on your left / right side.  3. Place a soft object, such as a folded towel or a small pillow, between your left / right upper arm and your body to move your elbow a few inches away (about 10 cm) from your side.  4. Hold the end of the band so it is tight and there is no slack.  5. Keeping your elbow pressed against the soft object, move your left / right forearm out, away from your abdomen. Keep your body steady so only your forearm moves.  6. Hold for __________ seconds.  7. Slowly return to the starting position.  Repeat __________ times. Complete this exercise __________ times a day.  Exercise H: Shoulder Abduction  1. Sit in a stable chair without armrests, or stand.  2. Hold a __________ weight in your left / right hand, or hold an exercise band with both hands.  3. Start with your arms straight down and your left / right palm facing in, toward your body.  4. Slowly lift your left / right hand out to your side. Do not lift your hand above shoulder height unless your health care provider tells you that this is safe.  ? Keep your arms straight.  ? Avoid shrugging your shoulder while you do this movement. Keep your shoulder blade tucked down toward the middle of your back.  5. Hold for __________ seconds.  6. Slowly lower your arm, and return to the starting position.  Repeat __________ times. Complete this exercise __________ times a day.  Exercise I: Shoulder Extension  1. Sit in a stable chair without armrests, or stand.  2. Secure an exercise band to a stable object in front of you where it is at shoulder height.  3. Hold one end of the exercise band in each hand. Your palms should face each  "other.  4. Straighten your elbows and lift your hands up to shoulder height.  5. Step back, away from the secured end of the exercise band, until the band is tight and there is no slack.  6. Squeeze your shoulder blades together as you pull your hands down to the sides of your thighs. Stop when your hands are straight down by your sides. Do not let your hands go behind your body.  7. Hold for __________ seconds.  8. Slowly return to the starting position.  Repeat __________ times. Complete this exercise __________ times a day.  Exercise J: Standing Shoulder Row  1. Sit in a stable chair without armrests, or stand.  2. Secure an exercise band to a stable object in front of you so it is at waist height.  3. Hold one end of the exercise band in each hand. Your palms should be in a thumbs-up position.  4. Bend each of your elbows to an \"L\" shape (about 90 degrees) and keep your upper arms at your sides.  5. Step back until the band is tight and there is no slack.  6. Slowly pull your elbows back behind you.  7. Hold for __________ seconds.  8. Slowly return to the starting position.  Repeat __________ times. Complete this exercise __________ times a day.  Exercise K: Shoulder Press-Ups  1. Sit in a stable chair that has armrests. Sit upright, with your feet flat on the floor.  2. Put your hands on the armrests so your elbows are bent and your fingers are pointing forward. Your hands should be about even with the sides of your body.  3. Push down on the armrests and use your arms to lift yourself off of the chair. Straighten your elbows and lift yourself up as much as you comfortably can.  ? Move your shoulder blades down, and avoid letting your shoulders move up toward your ears.  ? Keep your feet on the ground. As you get stronger, your feet should support less of your body weight as you lift yourself up.  4. Hold for __________ seconds.  5. Slowly lower yourself back into the chair.  Repeat __________ times. Complete " this exercise __________ times a day.  Exercise L: Wall Push-Ups  1. Stand so you are facing a stable wall. Your feet should be about one arm-length away from the wall.  2. Lean forward and place your palms on the wall at shoulder height.  3. Keep your feet flat on the floor as you bend your elbows and lean forward toward the wall.  4. Hold for __________ seconds.  5. Straighten your elbows to push yourself back to the starting position.  Repeat __________ times. Complete this exercise __________ times a day.  This information is not intended to replace advice given to you by your health care provider. Make sure you discuss any questions you have with your health care provider.  Document Released: 11/01/2006 Document Revised: 04/23/2019 Document Reviewed: 08/28/2016  [employer] Interactive Patient Education © 2019 [employer]

## 2020-03-05 ENCOUNTER — OFFICE VISIT (OUTPATIENT)
Dept: GYNECOLOGIC ONCOLOGY | Facility: CLINIC | Age: 65
End: 2020-03-05

## 2020-03-05 VITALS
SYSTOLIC BLOOD PRESSURE: 129 MMHG | RESPIRATION RATE: 12 BRPM | WEIGHT: 191 LBS | BODY MASS INDEX: 33.84 KG/M2 | TEMPERATURE: 96.8 F | DIASTOLIC BLOOD PRESSURE: 75 MMHG | OXYGEN SATURATION: 98 % | HEART RATE: 88 BPM

## 2020-03-05 DIAGNOSIS — D07.1 VIN III (VULVAR INTRAEPITHELIAL NEOPLASIA III): ICD-10-CM

## 2020-03-05 DIAGNOSIS — D07.2 VAIN III (VAGINAL INTRAEPITHELIAL NEOPLASIA GRADE III): Primary | ICD-10-CM

## 2020-03-05 DIAGNOSIS — N87.1 CIN II (CERVICAL INTRAEPITHELIAL NEOPLASIA II): ICD-10-CM

## 2020-03-05 PROCEDURE — 99214 OFFICE O/P EST MOD 30 MIN: CPT | Performed by: NURSE PRACTITIONER

## 2020-03-05 NOTE — PROGRESS NOTES
GYN ONCOLOGY FOLLOW-UP    Kathy Asher  [id number]  1955    Chief Complaint: Follow-up (c/o occasional cramping )        History of present illness:  Kathy Asher is a 64 y.o. 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 She is s/p upper vaginectomy on 6/27/2017.  She also has a h/o ELA III and ANNABELLA II, see history below. Last pap smear on 2/19/2019 was negative, improved from previous LSIL paps.  Upon arrival patient c/o occasional abdominal cramping. She notes symptoms are improved when she is increasing dietary fiber and having more normal BMs. Symptoms worse if no BM in 2-3 days. Her colonoscopy is UTD. She denies any bladder changes, abnormal lesions, vaginal bleeding, or pelvic pain.     Dysplasia history:  2007: LEEP with Dr. Gupta. Recurrent LSIL pap smears every 6 months after that time  8/27/2014: Biopsy of posterior vulvar lesion, path revealed ELA III. Referred to Dr. Harrell for second opinion  10/2/2014: Colpo with biopsies, revealed mild squamous dysplasia with HPV effect  12/10/2014: LAVH/BSO with vulvar WLE. Pathology consistent with ELA III, left margin focally positive. Cervix showed extensive endocervical moderate squamous epithelial dysplasia with HPV effect (ANNABELLA II).   4/18/2016: Pap smear of vaginal cuff showed LSIL with HPV non 16/18+  4/28/2016: Vaginoscopy with biopsy, pathology revealed severe dysplasia with HPV effect  6/27/2016: WLE of vaginal dysplasia, pathology consistent with ELA III, margins negative for dysplasia but positive for HPV effect  1/31/2017: Pap smear of vaginal cuff showed LSIL, stable considering history  1/29/2018: Pap of cuff shows persistent LSIL, stable  2/19/2019: Pap smear of vaginal cuff negative.       Oncology History:       Severe vaginal dysplasia    5/2/2016 Initial Diagnosis     Severe vaginal dysplasia        ANNABELLA II (cervical intraepithelial neoplasia II)     Initial Diagnosis     ANNABELLA II (cervical  intraepithelial neoplasia II)         Past Medical History:   Diagnosis Date   • Abnormal finding on lung imaging     CT scan of the chest 06/04/2014 reports interval improvement in the right perihilar masslike opacity and stability in the left perihilar opacity, pulmonary nodules and lymphadenopathy.   • Abnormal Pap smear of cervix    • Allergic rhinitis    • Asthma    • Cervical dysplasia     CIN2   • History of chest x-ray 08/27/2015    interval increase in patchy opacity within the right upper lobe compared to prior study; previously described left mid lung opacity appears similar to the prior study; dextrocurvature of thoracic spine with mild degenerative changes of the spine    • History of chest x-ray 07/09/2012    extensive, masslike and infiltrating disease of the right upper lobe and mild left perihilar disease   • History of chest x-ray 11/17/2010    chest is hyperinflated; infiltrate in right upper lobe apical segment with some associated adenopathy and old granulomatous changes    • History of histoplasmosis    • History of PFTs 06/06/2014    data is acceptable and reproducible; pt given 3 puffs of albuterol; pt gave good effort    • History of PFTs 08/12/2013    data is acceptable and reproducible; pt given 3 puffs of albuterol; pt gave good effort    • History of PFTs 01/24/2011    pt gave good effort; spirometry is acceptable and reproducible;    • IBS (irritable bowel syndrome)    • Sarcoidosis     Diagnosed by lymph node biopsy in 2000.  She has well preserved spirometry.   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   • Vulvar dysplasia     VIN3       Past Surgical History:   Procedure Laterality Date   • BREAST BIOPSY     • COLONOSCOPY     • DIAGNOSTIC LAPAROSCOPY EXPLORATORY LAPAROTOMY     • EXCISION LESION  06/27/2016    vaginal WLE   • LYMPH NODE BIOPSY     • MOUTH SURGERY         MEDICATIONS: The current medication list was reviewed and reconciled.     Allergies:  is allergic to  ciprofloxacin and etodolac.    Family History   Problem Relation Age of Onset   • Hyperlipidemia Mother    • Hypertension Mother    • Arthritis Mother    • Heart disease Father    • Diabetes Sister    • Hyperlipidemia Sister    • Diabetes Maternal Grandmother        Review of Systems   Constitutional: Negative for appetite change, chills, fatigue, fever and unexpected weight change.   Respiratory: Negative for cough, shortness of breath and wheezing.    Cardiovascular: Negative for chest pain, palpitations and leg swelling.   Gastrointestinal: Positive for constipation (with cramping). Negative for abdominal distention, abdominal pain, blood in stool, diarrhea, nausea and vomiting.   Endocrine: Negative.    Genitourinary: Negative for dyspareunia, dysuria, frequency, genital sores, hematuria, pelvic pain, urgency, vaginal bleeding, vaginal discharge and vaginal pain.   Musculoskeletal: Negative for arthralgias, gait problem and joint swelling.   Neurological: Negative for dizziness, seizures, syncope, weakness, light-headedness, numbness and headaches.   Hematological: Negative for adenopathy.   Psychiatric/Behavioral: Negative.        Depression screening: PHQ-9 Total Score: 5    Physical Exam  Vital Signs: /75   Pulse 88   Temp 96.8 °F (36 °C) (Temporal)   Resp 12   Wt 86.6 kg (191 lb)   SpO2 98%   BMI 33.84 kg/m²   Vitals:    03/05/20 1349   PainSc: 0-No pain           General Appearance:  alert, cooperative, no apparent distress, appears stated age and obese   Neurologic/Psychiatric: A&O x 3, gait steady, appropriate affect   HEENT:  Normocephalic, without obvious abnormality, mucous membranes moist   Neck: Supple, symmetrical, trachea midline, no adenopathy;  No thyromegaly, masses, or tenderness   Back:   Symmetric, no curvature, ROM normal, no CVA tenderness   Lungs:   Clear to auscultation bilaterally; respirations regular, even, and unlabored bilaterally   Heart:  Regular rate and rhythm, no  murmurs appreciated   Breasts:  deferred   Abdomen:   Soft, non-tender, non-distended and no organomegaly   Lymph nodes: No cervical, supraclavicular, inguinal adenopathy noted   Extremities: Normal, atraumatic; no clubbing, cyanosis, or edema    Pelvic: External Genitalia  without lesions or skin changes  Vagina  is pink, moist, without lesions.   Vaginal Cuff  Female Vaginal Cuff: smooth, intact, without visible lesions and pap obtained  Uterus  surgically absent and no masses appreciated  Ovaries  surgically absent bilaterally  Parametria  smooth  Rectovaginal  Female rectovaginal: deferred         Procedure Notes:  No notes on file    Assessment and Plan:    Kathy was seen today for follow-up.    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 She is understanding to call with any changes in pelvic symptoms or general GYN concerns at any time between regularly scheduled visits.     Pap was done today. If she does not receive the results of the Pap within 2 weeks  time, she was instructed to call to find out the results.      Patient and I discussed a more regular home bowel regimen, including using Metamucil or similar fiber on days she is getting less in her diet, adding stool softener once or twice daily, and using Miralax PRN if no BM in 2-3 days. Keeping bowels more regular can help decrease cramping and discomfort. She v/u.     Pain assessment was performed today as a part of patient’s care.  For patients with pain related to surgery, gynecologic malignancy or cancer treatment, the plan is as noted in the assessment/plan.  For patients with pain not related to these issues, they are to seek any further needed care from a more appropriate provider, such as PCP. No pain today.      Advance Care Planning   ACP discussion was declined by the patient.       Return to clinic in 1 year for ongoing VAIN surveillance.      Lina Butcher, APRN

## 2020-04-06 ENCOUNTER — TELEPHONE (OUTPATIENT)
Dept: FAMILY MEDICINE CLINIC | Facility: CLINIC | Age: 65
End: 2020-04-06

## 2020-04-06 NOTE — TELEPHONE ENCOUNTER
PT. CALLED AND STATED SHE HAS BEEN EXPERIENCING SEVERE PAIN IN HER LOWER RIGHT ABDOMEN SINCE 4/5    LBM WAS 4/5    PLEASE CALL AND ADVISE PT. -[phone]

## 2020-04-07 NOTE — TELEPHONE ENCOUNTER
Called patient and she states that it has diminished some, has been taking Advil. Painful when she sits down or bends over. Feels like it may be related to bowel movements, states that she does not have regular movements. Stated that she had an issue in the past with dehydration. Does not have regular movements, varies between soft and hard pellets. States that sometimes she feels like she needs to go to the bathroom but when she tries she is unable too. Pt states that she has not had a fever. Pt states that she takes Metamucil when she starts having a problem. Wonders if she should take more regularly?

## 2020-04-07 NOTE — TELEPHONE ENCOUNTER
Called patient, gave message below. Pt verbalized understanding and had no further questions or concerns at this time.

## 2020-08-13 DIAGNOSIS — Z01.812 BLOOD TESTS PRIOR TO TREATMENT OR PROCEDURE: Primary | ICD-10-CM

## 2020-08-14 ENCOUNTER — OFFICE VISIT (OUTPATIENT)
Dept: FAMILY MEDICINE CLINIC | Facility: CLINIC | Age: 65
End: 2020-08-14

## 2020-08-14 ENCOUNTER — LAB (OUTPATIENT)
Dept: PULMONOLOGY | Facility: CLINIC | Age: 65
End: 2020-08-14

## 2020-08-14 ENCOUNTER — LAB (OUTPATIENT)
Dept: LAB | Facility: HOSPITAL | Age: 65
End: 2020-08-14

## 2020-08-14 VITALS
HEIGHT: 63 IN | OXYGEN SATURATION: 98 % | BODY MASS INDEX: 32.96 KG/M2 | WEIGHT: 186 LBS | SYSTOLIC BLOOD PRESSURE: 136 MMHG | HEART RATE: 79 BPM | DIASTOLIC BLOOD PRESSURE: 80 MMHG | TEMPERATURE: 97.3 F

## 2020-08-14 DIAGNOSIS — Z13.220 SCREENING FOR LIPID DISORDERS: ICD-10-CM

## 2020-08-14 DIAGNOSIS — E66.01 MORBID (SEVERE) OBESITY DUE TO EXCESS CALORIES (HCC): ICD-10-CM

## 2020-08-14 DIAGNOSIS — Z13.1 SCREENING FOR DIABETES MELLITUS (DM): ICD-10-CM

## 2020-08-14 DIAGNOSIS — Z00.00 MEDICARE ANNUAL WELLNESS VISIT, INITIAL: Primary | ICD-10-CM

## 2020-08-14 DIAGNOSIS — E55.9 VITAMIN D DEFICIENCY: ICD-10-CM

## 2020-08-14 LAB — 25(OH)D3 SERPL-MCNC: 40 NG/ML (ref 30–100)

## 2020-08-14 PROCEDURE — 82306 VITAMIN D 25 HYDROXY: CPT | Performed by: NURSE PRACTITIONER

## 2020-08-14 PROCEDURE — 80053 COMPREHEN METABOLIC PANEL: CPT | Performed by: NURSE PRACTITIONER

## 2020-08-14 PROCEDURE — 80061 LIPID PANEL: CPT | Performed by: NURSE PRACTITIONER

## 2020-08-14 PROCEDURE — U0004 COV-19 TEST NON-CDC HGH THRU: HCPCS | Performed by: NURSE PRACTITIONER

## 2020-08-14 PROCEDURE — G0402 INITIAL PREVENTIVE EXAM: HCPCS | Performed by: NURSE PRACTITIONER

## 2020-08-14 PROCEDURE — 99397 PER PM REEVAL EST PAT 65+ YR: CPT | Performed by: NURSE PRACTITIONER

## 2020-08-14 PROCEDURE — 99000 SPECIMEN HANDLING OFFICE-LAB: CPT | Performed by: NURSE PRACTITIONER

## 2020-08-14 PROCEDURE — U0002 COVID-19 LAB TEST NON-CDC: HCPCS | Performed by: NURSE PRACTITIONER

## 2020-08-14 NOTE — PROGRESS NOTES
The ABCs of the Annual Wellness Visit  Initial Medicare Wellness Visit    Chief Complaint   Patient presents with   • Annual Exam   • Neck Pain     She reports that recently she has been having sharp, shooting pain from her neck when turning her head. She is also noticing sharp pains in her arm and    • Abdominal Pain     SHe has been having stomach pain and loose stool for prolong periods of time. She reports that the pain is constant and daily. She has taken Metamucil OTC and it has helped slightly       Subjective   History of Present Illness:  Kathy Asher is a 64 y.o. female who presents for an Initial Medicare Wellness Visit.    Neck pain- Patient in with sharp shooting neck pain at times for past 4-5 months, occurring several times per week, most every day, she can stand still for a minute and pain resolves. Does sleep in a collar at times, does help pain. Does turn head a times for exercise, does not really help much. She does have a HX of neck injury in the mid 1980's. Was playing volleyball and ran into another player and neck cracked. Went to be evaluated 1-2 months later.     Also with sticking pains in arms at times, 3-4 months at times,     ABD Pain- slight discomfort all around ABD, various areas in ABD.  She does have not solid stool, loose, used to be sporadic, now pretty consistent.         HEALTH RISK ASSESSMENT    Recent Hospitalizations:  No hospitalization(s) within the last year.    Current Medical Providers:  Patient Care Team:  Larry Yu APRN as PCP - General (Family Medicine)  Lina Butcher APRN as Nurse Practitioner (Nurse Practitioner)  Jimmy Beal MD as Consulting Physician (Gastroenterology)    Smoking Status:  Social History     Tobacco Use   Smoking Status Never Smoker   Smokeless Tobacco Never Used       Alcohol Consumption:  Social History     Substance and Sexual Activity   Alcohol Use No       Depression Screen:   PHQ-2/PHQ-9 Depression  Screening 3/5/2020   Little interest or pleasure in doing things 1   Feeling down, depressed, or hopeless 1   Trouble falling or staying asleep, or sleeping too much 1   Feeling tired or having little energy 1   Poor appetite or overeating 0   Feeling bad about yourself - or that you are a failure or have let yourself or your family down 0   Trouble concentrating on things, such as reading the newspaper or watching television 1   Moving or speaking so slowly that other people could have noticed. Or the opposite - being so fidgety or restless that you have been moving around a lot more than usual 0   Thoughts that you would be better off dead, or of hurting yourself in some way 0   Total Score 5   If you checked off any problems, how difficult have these problems made it for you to do your work, take care of things at home, or get along with other people? Somewhat difficult       Fall Risk Screen:  LEONARDOADI Fall Risk Assessment has not been completed.    Health Habits and Functional and Cognitive Screening:  No flowsheet data found.      Does the patient have evidence of cognitive impairment? No    Asprin use counseling:Does not need ASA (and currently is not on it)    Age-appropriate Screening Schedule:  Refer to the list below for future screening recommendations based on patient's age, sex and/or medical conditions. Orders for these recommended tests are listed in the plan section. The patient has been provided with a written plan.    Health Maintenance   Topic Date Due   • ZOSTER VACCINE (1 of 2) 12/19/2004   • INFLUENZA VACCINE  08/01/2020   • MAMMOGRAM  09/06/2021   • COLONOSCOPY  10/14/2021   • PAP SMEAR  03/05/2023   • TDAP/TD VACCINES (2 - Td) 08/03/2027          The following portions of the patient's history were reviewed and updated as appropriate: allergies, current medications, past family history, past medical history, past social history, past surgical history and problem list.    Outpatient Medications  Prior to Visit   Medication Sig Dispense Refill   • albuterol (PROAIR HFA) 108 (90 Base) MCG/ACT inhaler Inhale 2 puffs Every 4 (Four) Hours As Needed for Wheezing. 1 inhaler 11   • Ascorbic Acid (VITAMIN C) 500 MG capsule Take  by mouth.     • azelastine (ASTELIN) 0.1 % nasal spray U 2 SPRAYS IEN BID PRF ALLERGIES  11   • Calcium-Magnesium-Vitamin D - MG-MG-UNIT tablet sustained-release 24 hour Take 1 tablet by mouth Daily.     • cetirizine (zyrTEC) 10 MG tablet Take 10 mg by mouth Daily.     • cetirizine-pseudoephedrine (ZYRTEC-D ALLERGY & CONGESTION) 5-120 MG per 12 hr tablet Take  by mouth.     • fluticasone (FLONASE) 50 MCG/ACT nasal spray into each nostril 2 (two) times a day.     • latanoprost (XALATAN) 0.005 % ophthalmic solution Apply  to eye.     • montelukast (SINGULAIR) 10 MG tablet TK 1 T PO IN THE DEREK  11   • Multiple Vitamin (MULTI-VITAMIN) tablet Take  by mouth.     • Vitamin E 400 UNITS tablet Take  by mouth.     • brompheniramine-pseudoephedrine-DM 30-2-10 MG/5ML syrup Take 5 mL by mouth 4 (Four) Times a Day As Needed for Congestion or Cough. 120 mL 1   • meloxicam (MOBIC) 7.5 MG tablet Take 1 tablet by mouth Daily. 30 tablet 1     No facility-administered medications prior to visit.        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)   • Abnormal finding on lung imaging   • Asthma   • IBS (irritable bowel syndrome)   • Murmur   • Raised level of immunoglobulins   • Sarcoidosis   • Allergic rhinitis   • ANNABELLA II (cervical intraepithelial neoplasia II)   • ELA III (vulvar intraepithelial neoplasia III)   • Open-angle glaucoma   • Regular astigmatism   • Polycythemia   • Morbid (severe) obesity due to excess calories (CMS/[facility])       Advanced Care Planning:  ACP discussion was held with the patient during this visit. Patient does not have an advance directive, information provided.    Review of Systems  "  Constitutional: Positive for fatigue. Negative for appetite change, chills and fever.   HENT: Positive for congestion and rhinorrhea. Negative for ear pain, hearing loss, sinus pressure and sore throat.    Eyes: Negative for itching and visual disturbance.   Respiratory: Positive for cough, chest tightness, shortness of breath and wheezing.         Using inhaler freq past few months, seeing Pulmonary   Cardiovascular: Negative for chest pain, palpitations and leg swelling.   Gastrointestinal: Positive for abdominal pain (scattered areas of discomfort). Negative for blood in stool, constipation, diarrhea (loose stool), nausea and vomiting.   Endocrine: Positive for heat intolerance. Negative for cold intolerance, polydipsia, polyphagia and polyuria.   Genitourinary: Negative for difficulty urinating, dysuria and hematuria.   Musculoskeletal: Positive for arthralgias, neck pain and neck stiffness. Negative for back pain, joint swelling and myalgias.   Skin: Positive for color change (neck). Negative for rash and wound.   Allergic/Immunologic: Positive for environmental allergies. Negative for food allergies.   Neurological: Positive for headaches. Negative for dizziness and numbness.   Hematological: Negative for adenopathy. Does not bruise/bleed easily.   Psychiatric/Behavioral: Positive for dysphoric mood. Negative for sleep disturbance. The patient is nervous/anxious.        Compared to one year ago, the patient feels her physical health is the same.  Compared to one year ago, the patient feels her mental health is the same.    Reviewed chart for potential of high risk medication in the elderly: not applicable  Reviewed chart for potential of harmful drug interactions in the elderly:yes    Objective         Vitals:    08/14/20 0832   BP: 136/80   Pulse: 79   Temp: 97.3 °F (36.3 °C)   SpO2: 98%   Weight: 84.4 kg (186 lb)   Height: 160 cm (62.99\")   PainSc:   4       Body mass index is 32.96 kg/m².  Discussed the " patient's BMI with her. The BMI is above average; BMI management plan is completed.    Physical Exam   Constitutional: She is oriented to person, place, and time. She appears well-developed and well-nourished. No distress.   HENT:   Head: Normocephalic and atraumatic.   Right Ear: External ear normal.   Left Ear: External ear normal.   Nose: Nose normal.   Mouth/Throat: Oropharynx is clear and moist.   Eyes: Pupils are equal, round, and reactive to light. Conjunctivae and EOM are normal. Right eye exhibits no discharge. Left eye exhibits no discharge. No scleral icterus.   Neck: Normal range of motion. Neck supple. No JVD (no bruits) present. No tracheal deviation present. No thyromegaly present.   Cardiovascular: Normal rate, regular rhythm, normal heart sounds and intact distal pulses. Exam reveals no gallop and no friction rub.   No murmur heard.  Pulmonary/Chest: Effort normal and breath sounds normal. No respiratory distress. She has no wheezes. She has no rales.   Abdominal: Soft. Normal appearance and bowel sounds are normal. She exhibits no distension and no mass. There is no hepatosplenomegaly. There is no tenderness. No hernia.   Genitourinary:   Genitourinary Comments: Breast and Vaginal exam deferred   Musculoskeletal: Normal range of motion. She exhibits no edema, tenderness or deformity.   Lymphadenopathy:     She has no cervical adenopathy.   Neurological: She is alert and oriented to person, place, and time. She has normal reflexes. She displays normal reflexes.   Skin: Skin is warm and dry. No rash noted. She is not diaphoretic. No erythema. No pallor.   Psychiatric: She has a normal mood and affect. Her behavior is normal. Thought content normal.   Nursing note and vitals reviewed.            Assessment/Plan   Medicare Risks and Personalized Health Plan  CMS Preventative Services Quick Reference  Advance Directive Discussion  Breast Cancer/Mammogram Screening  Immunizations Discussed/Encouraged  (specific immunizations; Influenza, Pneumococcal 23 and Shingrix )  Obesity/Overweight   Patient wants to wait on getting Pneumonia until she sees pulmonary.  I recommend shingles vaccination she can get this from her pharmacy.    The above risks/problems have been discussed with the patient.  Pertinent information has been shared with the patient in the After Visit Summary.  Follow up plans and orders are seen below in the Assessment/Plan Section.    Diagnoses and all orders for this visit:    1. Medicare annual wellness visit, initial (Primary)  -     Comprehensive Metabolic Panel; Future  -     Lipid Panel; Future  -     Vitamin D 25 Hydroxy; Future  -     Comprehensive Metabolic Panel  -     Lipid Panel  -     Vitamin D 25 Hydroxy    2. Screening for diabetes mellitus (DM)  -     Comprehensive Metabolic Panel; Future  -     Comprehensive Metabolic Panel    3. Screening for lipid disorders  -     Lipid Panel; Future  -     Lipid Panel    4. Vitamin D deficiency  -     Vitamin D 25 Hydroxy; Future  -     Vitamin D 25 Hydroxy    5. Morbid (severe) obesity due to excess calories (CMS/[facility])    The wellness exam has been reviewed in detail.  The patient has been fully counseled on preventative guidelines for vaccines, cancer screenings, and other health maintenance needs.  Functional testing has been performed to assess capacity for independent living and need for other medical interventions.   The patient was counseled on maintaining a lifestyle to promote good health and to minimize chronic diseases.  The patient has been assisted with scheduling healthcare procedures for the coming year and given a written document outlining these recommendations.    Will obtain routine labs today and make further recommendations pending results.     Discussed need for weight management. Discussed weight loss with diet, recommend Weight Watchers or Mediterranean Diet, but stated that whatever method works for this patient is best.  Reviewed need for regular exercise.  The patient agrees with all recommendations at this time. I have discussed a weight loss plan to be determined by this patient.       Follow Up:  Return in about 1 year (around 8/14/2021), or if symptoms worsen or fail to improve, for Annual, Medicare Wellness.     An After Visit Summary and PPPS were given to the patient.

## 2020-08-14 NOTE — PATIENT INSTRUCTIONS
Obesity, Adult  Obesity is the condition of having too much total body fat. Being overweight or obese means that your weight is greater than what is considered healthy for your body size. Obesity is determined by a measurement called BMI. BMI is an estimate of body fat and is calculated from height and weight. For adults, a BMI of 30 or higher is considered obese.  Obesity can lead to other health concerns and major illnesses, including:  · Stroke.  · Coronary artery disease (CAD).  · Type 2 diabetes.  · Some types of cancer, including cancers of the colon, breast, uterus, and gallbladder.  · Osteoarthritis.  · High blood pressure (hypertension).  · High cholesterol.  · Sleep apnea.  · Gallbladder stones.  · Infertility problems.  What are the causes?  Common causes of this condition include:  · Eating daily meals that are high in calories, sugar, and fat.  · Being born with genes that may make you more likely to become obese.  · Having a medical condition that causes obesity, including:  ? Hypothyroidism.  ? Polycystic ovarian syndrome (PCOS).  ? Binge-eating disorder.  ? Cushing syndrome.  · Taking certain medicines, such as steroids, antidepressants, and seizure medicines.  · Not being physically active (sedentary lifestyle).  · Not getting enough sleep.  · Drinking high amounts of sugar-sweetened beverages, such as soft drinks.  What increases the risk?  The following factors may make you more likely to develop this condition:  · Having a family history of obesity.  · Being a woman of  descent.  · Being a man of  descent.  · Living in an area with limited access to:  ? [place], recreation centers, or sidewalks.  ? Healthy food choices, such as grocery stores and farmers' markets.  What are the signs or symptoms?  The main sign of this condition is having too much body fat.  How is this diagnosed?  This condition is diagnosed based on:  · Your BMI. If you are an adult with a BMI of 30 or  higher, you are considered obese.  · Your waist circumference. This measures the distance around your waistline.  · Your skinfold thickness. Your health care provider may gently pinch a fold of your skin and measure it.  You may have other tests to check for underlying conditions.  How is this treated?  Treatment for this condition often includes changing your lifestyle. Treatment may include some or all of the following:  · Dietary changes. This may include developing a healthy meal plan.  · Regular physical activity. This may include activity that causes your heart to beat faster (aerobic exercise) and strength training. Work with your health care provider to design an exercise program that works for you.  · Medicine to help you lose weight if you are unable to lose 1 pound a week after 6 weeks of healthy eating and more physical activity.  · Treating conditions that cause the obesity (underlying conditions).  · Surgery. Surgical options may include gastric banding and gastric bypass. Surgery may be done if:  ? Other treatments have not helped to improve your condition.  ? You have a BMI of 40 or higher.  ? You have life-threatening health problems related to obesity.  Follow these instructions at home:  Eating and drinking    · Follow recommendations from your health care provider about what you eat and drink. Your health care provider may advise you to:  ? Limit fast food, sweets, and processed snack foods.  ? Choose low-fat options, such as low-fat milk instead of whole milk.  ? Eat 5 or more servings of fruits or vegetables every day.  ? Eat at home more often. This gives you more control over what you eat.  ? Choose healthy foods when you eat out.  ? Learn to read food labels. This will help you understand how much food is considered 1 serving.  ? Learn what a healthy serving size is.  ? Keep low-fat snacks available.  ? Limit sugary drinks, such as soda, fruit juice, sweetened iced tea, and flavored  milk.  · Drink enough water to keep your urine pale yellow.  · Do not follow a fad diet. Fad diets can be unhealthy and even dangerous.  Physical activity  · Exercise regularly, as told by your health care provider.  ? Most adults should get up to 150 minutes of moderate-intensity exercise every week.  ? Ask your health care provider what types of exercise are safe for you and how often you should exercise.  · Warm up and stretch before being active.  · Cool down and stretch after being active.  · Rest between periods of activity.  Lifestyle  · Work with your health care provider and a dietitian to set a weight-loss goal that is healthy and reasonable for you.  · Limit your screen time.  · Find ways to reward yourself that do not involve food.  · Do not drink alcohol if:  ? Your health care provider tells you not to drink.  ? You are pregnant, may be pregnant, or are planning to become pregnant.  · If you drink alcohol:  ? Limit how much you use to:  § 0-1 drink a day for women.  § 0-2 drinks a day for men.  ? Be aware of how much alcohol is in your drink. In the [place] one drink equals one 12 oz bottle of beer (355 mL), one 5 oz glass of wine (148 mL), or one 1½ oz glass of hard liquor (44 mL).  General instructions  · Keep a weight-loss journal to keep track of the food you eat and how much exercise you get.  · Take over-the-counter and prescription medicines only as told by your health care provider.  · Take vitamins and supplements only as told by your health care provider.  · Consider joining a support group. Your health care provider may be able to recommend a support group.  · Keep all follow-up visits as told by your health care provider. This is important.  Contact a health care provider if:  · You are unable to meet your weight loss goal after 6 weeks of dietary and lifestyle changes.  Get help right away if you are having:  · Trouble breathing.  · Suicidal thoughts or behaviors.  Summary  · Obesity is the  condition of having too much total body fat.  · Being overweight or obese means that your weight is greater than what is considered healthy for your body size.  · Work with your health care provider and a dietitian to set a weight-loss goal that is healthy and reasonable for you.  · Exercise regularly, as told by your health care provider. Ask your health care provider what types of exercise are safe for you and how often you should exercise.  This information is not intended to replace advice given to you by your health care provider. Make sure you discuss any questions you have with your health care provider.  Document Released: 01/25/2006 Document Revised: 08/22/2019 Document Reviewed: 08/22/2019  Elsevier Patient Education © 2020 [employer]

## 2020-08-15 LAB
ALBUMIN SERPL-MCNC: 4.1 G/DL (ref 3.5–5.2)
ALBUMIN/GLOB SERPL: 1.2 G/DL
ALP SERPL-CCNC: 43 U/L (ref 39–117)
ALT SERPL W P-5'-P-CCNC: 18 U/L (ref 1–33)
ANION GAP SERPL CALCULATED.3IONS-SCNC: 10.6 MMOL/L (ref 5–15)
AST SERPL-CCNC: 21 U/L (ref 1–32)
BILIRUB SERPL-MCNC: 0.3 MG/DL (ref 0–1.2)
BUN SERPL-MCNC: 15 MG/DL (ref 8–23)
BUN/CREAT SERPL: 12.1 (ref 7–25)
CALCIUM SPEC-SCNC: 10.1 MG/DL (ref 8.6–10.5)
CHLORIDE SERPL-SCNC: 102 MMOL/L (ref 98–107)
CHOLEST SERPL-MCNC: 244 MG/DL (ref 0–200)
CO2 SERPL-SCNC: 29.4 MMOL/L (ref 22–29)
CREAT SERPL-MCNC: 1.24 MG/DL (ref 0.57–1)
GFR SERPL CREATININE-BSD FRML MDRD: 53 ML/MIN/1.73
GLOBULIN UR ELPH-MCNC: 3.3 GM/DL
GLUCOSE SERPL-MCNC: 85 MG/DL (ref 65–99)
HDLC SERPL-MCNC: 63 MG/DL (ref 40–60)
LDLC SERPL CALC-MCNC: 152 MG/DL (ref 0–100)
LDLC/HDLC SERPL: 2.42 {RATIO}
POTASSIUM SERPL-SCNC: 4.4 MMOL/L (ref 3.5–5.2)
PROT SERPL-MCNC: 7.4 G/DL (ref 6–8.5)
REF LAB TEST METHOD: NORMAL
SARS-COV-2 RNA RESP QL NAA+PROBE: NOT DETECTED
SODIUM SERPL-SCNC: 142 MMOL/L (ref 136–145)
TRIGL SERPL-MCNC: 144 MG/DL (ref 0–150)
VLDLC SERPL-MCNC: 28.8 MG/DL (ref 5–40)

## 2020-08-17 ENCOUNTER — OFFICE VISIT (OUTPATIENT)
Dept: PULMONOLOGY | Facility: CLINIC | Age: 65
End: 2020-08-17

## 2020-08-17 VITALS
BODY MASS INDEX: 33.31 KG/M2 | HEART RATE: 78 BPM | WEIGHT: 188 LBS | SYSTOLIC BLOOD PRESSURE: 118 MMHG | HEIGHT: 63 IN | OXYGEN SATURATION: 99 % | RESPIRATION RATE: 16 BRPM | TEMPERATURE: 98 F | DIASTOLIC BLOOD PRESSURE: 68 MMHG

## 2020-08-17 DIAGNOSIS — J30.89 NON-SEASONAL ALLERGIC RHINITIS, UNSPECIFIED TRIGGER: Primary | ICD-10-CM

## 2020-08-17 DIAGNOSIS — N18.2 STAGE 2 CHRONIC KIDNEY DISEASE: Primary | ICD-10-CM

## 2020-08-17 DIAGNOSIS — J45.20 MILD INTERMITTENT ASTHMA WITHOUT COMPLICATION: ICD-10-CM

## 2020-08-17 PROCEDURE — 94726 PLETHYSMOGRAPHY LUNG VOLUMES: CPT | Performed by: NURSE PRACTITIONER

## 2020-08-17 PROCEDURE — 94729 DIFFUSING CAPACITY: CPT | Performed by: NURSE PRACTITIONER

## 2020-08-17 PROCEDURE — 94375 RESPIRATORY FLOW VOLUME LOOP: CPT | Performed by: NURSE PRACTITIONER

## 2020-08-17 PROCEDURE — 99214 OFFICE O/P EST MOD 30 MIN: CPT | Performed by: NURSE PRACTITIONER

## 2020-08-17 NOTE — PROGRESS NOTES
The test results show that your kidney function is slightly worse than the last 2 years.  Lets try increasing your fluid intake and repeat numbness in 1 month.  I have ordered this in the computer and you can have it drawn at any [facility] lab.  Your cholesterol is worse than last year although it similar to 2 years ago.  Continue to recommend you try low-fat low-cholesterol diet and exercise at least 30 to 60 minutes 3 times a week.  The rest of your laboratory data shows no current significant abnormality.

## 2020-08-17 NOTE — PROGRESS NOTES
[facility] Pulmonary Follow up    CHIEF COMPLAINT    Allergic rhinitis    HISTORY OF PRESENT ILLNESS    Kahty Asher is a 64 y.o.female here today for follow-up of her allergic rhinitis.  She was last seen in the office by me in December 2018.  She denies any respiratory illnesses or exacerbations since her last appointment.  She has noticed that her sinus congestion has worsened over the last couple of weeks.    She continues to use pro-air as needed for shortness of breath or wheezing.  She uses this a couple times per week.  She does have shortness of breath with activity but does recover quickly at rest.  She does follow with her allergist and saw them in April.  They had recommended allergy injections for her allergies but she refused.    She denies fever, chills, sputum production, hemoptysis, night sweats, weight loss, chest pain or palpitations.  She denies any lower extremity edema or calf tenderness.  She does have chronic sinus and allergy symptoms.  She does take Singulair Allegra, Zyrtec-D and Flonase on a daily basis.  She denies reflux symptoms and will occasionally take Tums as needed.    She is a lifetime non-smoker.  She is up-to-date on her current vaccinations.    She denies sleeping difficulties.    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)   • Abnormal finding on lung imaging   • Asthma   • IBS (irritable bowel syndrome)   • Murmur   • Raised level of immunoglobulins   • Sarcoidosis   • Allergic rhinitis   • ANNABELLA II (cervical intraepithelial neoplasia II)   • ELA III (vulvar intraepithelial neoplasia III)   • Open-angle glaucoma   • Regular astigmatism   • Polycythemia   • Morbid (severe) obesity due to excess calories (CMS/[facility])       Allergies   Allergen Reactions   • Ciprofloxacin Other (See Comments)     Mouth felt funny, very dry   • Etodolac Other (See Comments)     Slurred speech        Current  Outpatient Medications:   •  albuterol (PROAIR HFA) 108 (90 Base) MCG/ACT inhaler, Inhale 2 puffs Every 4 (Four) Hours As Needed for Wheezing., Disp: 1 inhaler, Rfl: 11  •  Ascorbic Acid (VITAMIN C) 500 MG capsule, Take  by mouth., Disp: , Rfl:   •  azelastine (ASTELIN) 0.1 % nasal spray, U 2 SPRAYS IEN BID PRF ALLERGIES, Disp: , Rfl: 11  •  Calcium-Magnesium-Vitamin D - MG-MG-UNIT tablet sustained-release 24 hour, Take 1 tablet by mouth Daily., Disp: , Rfl:   •  cetirizine (zyrTEC) 10 MG tablet, Take 10 mg by mouth Daily., Disp: , Rfl:   •  cetirizine-pseudoephedrine (ZYRTEC-D ALLERGY & CONGESTION) 5-120 MG per 12 hr tablet, Take  by mouth., Disp: , Rfl:   •  fluticasone (FLONASE) 50 MCG/ACT nasal spray, into each nostril 2 (two) times a day., Disp: , Rfl:   •  latanoprost (XALATAN) 0.005 % ophthalmic solution, Apply  to eye., Disp: , Rfl:   •  montelukast (SINGULAIR) 10 MG tablet, TK 1 T PO IN THE DEREK, Disp: , Rfl: 11  •  Multiple Vitamin (MULTI-VITAMIN) tablet, Take  by mouth., Disp: , Rfl:   •  Vitamin E 400 UNITS tablet, Take  by mouth., Disp: , Rfl:   MEDICATION LIST AND ALLERGIES REVIEWED.    Social History     Tobacco Use   • Smoking status: Never Smoker   • Smokeless tobacco: Never Used   Substance Use Topics   • Alcohol use: No   • Drug use: No       FAMILY AND SOCIAL HISTORY REVIEWED.    Review of Systems   Constitutional: Negative for activity change, appetite change, fatigue, fever and unexpected weight change.   HENT: Positive for congestion, postnasal drip and rhinorrhea. Negative for sinus pressure, sore throat and voice change.    Eyes: Negative for visual disturbance.   Respiratory: Positive for shortness of breath. Negative for cough, chest tightness and wheezing.    Cardiovascular: Negative for chest pain, palpitations and leg swelling.   Gastrointestinal: Negative for abdominal distention, abdominal pain, nausea and vomiting.   Endocrine: Negative for cold intolerance and heat  "intolerance.   Genitourinary: Negative for difficulty urinating and urgency.   Musculoskeletal: Negative for arthralgias, back pain and neck pain.   Skin: Negative for color change and pallor.   Allergic/Immunologic: Negative for environmental allergies and food allergies.   Neurological: Negative for dizziness, syncope, weakness and light-headedness.   Hematological: Negative for adenopathy. Does not bruise/bleed easily.   Psychiatric/Behavioral: Negative for agitation and behavioral problems.   .    /68   Pulse 78   Temp 98 °F (36.7 °C)   Resp 16   Ht 160 cm (62.99\")   Wt 85.3 kg (188 lb)   SpO2 99% Comment: Resting at room air  BMI 33.31 kg/m²     Immunization History   Administered Date(s) Administered   • FLUARIX/FLUZONE/AFLURIA/FLULAVAL QUAD 02/13/2020   • Flu Vaccine Quad PF >36MO 02/13/2020   • Td 08/01/2007   • Tdap 08/03/2017       Physical Exam   Constitutional: She is oriented to person, place, and time. She appears well-developed and well-nourished.   HENT:   Head: Normocephalic and atraumatic.   Eyes: Pupils are equal, round, and reactive to light.   Neck: Normal range of motion. Neck supple. No thyromegaly present.   Cardiovascular: Normal rate, regular rhythm, normal heart sounds and intact distal pulses. Exam reveals no gallop and no friction rub.   No murmur heard.  Pulmonary/Chest: Effort normal and breath sounds normal. No respiratory distress. She has no wheezes. She has no rales. She exhibits no tenderness.   Abdominal: Soft. Bowel sounds are normal. There is no tenderness.   Musculoskeletal: Normal range of motion. She exhibits no edema.   Lymphadenopathy:     She has no cervical adenopathy.   Neurological: She is alert and oriented to person, place, and time.   Skin: Skin is warm and dry. Capillary refill takes less than 2 seconds. She is not diaphoretic.   Psychiatric: She has a normal mood and affect. Her behavior is normal.   Nursing note and vitals " reviewed.        RESULTS    PFTS in the office today, read by me.  FVC 3.06 127% predicted, FEV1 2.25 120% predicted, FEV1/FVC 74% predicted, TLC 4.10 88% predicted, DLCO 83% predicted, no obstruction, no restriction and reduced DLCO.    PROBLEM LIST    Problem List Items Addressed This Visit        Respiratory    Allergic rhinitis - Primary            DISCUSSION    Ms. Trejo was here for follow-up of her allergic rhinitis.  We did review her PFTs in detail today and she has no obstruction or restriction in her airway pattern.  She will continue using her pro-air as needed for shortness of breath or wheezing.    I do suspect that some of her wheezing is related to her allergic rhinitis.  She seems to have constant postnasal drainage.  She will continue her medication for her allergic rhinitis.  I did advise her that if her allergies were to worsen she needs to start allergy injections as recommended by her allergist.    She will follow-up yearly or sooner if her symptoms worsen.  She will call with any additional concerns or questions.  I spent 15 minutes with the patient. I spent > 50% percent of this time counseling and discussing diagnosis, prognosis, diagnostic testing, evaluation, current status, treatment options and management.    SYLVESTER Snyder  08/17/202011:20 AM  Electronically signed     Please note that portions of this note were completed with a voice recognition program. Efforts were made to edit the dictations, but occasionally words are mistranscribed.      CC: Larry Yu, SYLVESTER

## 2021-02-02 ENCOUNTER — TELEPHONE (OUTPATIENT)
Dept: PULMONOLOGY | Facility: CLINIC | Age: 66
End: 2021-02-02

## 2021-02-02 NOTE — TELEPHONE ENCOUNTER
Patient called complaining of persistent cough and shortness of breath. Has seen her PCP and allergist and is not feeling better denies fever. Last seen here in august. Would like to make an appt here, okay to schedule? Any testing?

## 2021-02-05 ENCOUNTER — OFFICE VISIT (OUTPATIENT)
Dept: PULMONOLOGY | Facility: CLINIC | Age: 66
End: 2021-02-05

## 2021-02-05 VITALS
OXYGEN SATURATION: 96 % | HEART RATE: 85 BPM | BODY MASS INDEX: 33.49 KG/M2 | DIASTOLIC BLOOD PRESSURE: 80 MMHG | HEIGHT: 63 IN | SYSTOLIC BLOOD PRESSURE: 128 MMHG | WEIGHT: 189 LBS | TEMPERATURE: 97.3 F

## 2021-02-05 DIAGNOSIS — D86.9 SARCOIDOSIS: ICD-10-CM

## 2021-02-05 DIAGNOSIS — J45.20 MILD INTERMITTENT ASTHMA WITHOUT COMPLICATION: ICD-10-CM

## 2021-02-05 DIAGNOSIS — R06.02 SOB (SHORTNESS OF BREATH): Primary | ICD-10-CM

## 2021-02-05 PROCEDURE — 99214 OFFICE O/P EST MOD 30 MIN: CPT | Performed by: NURSE PRACTITIONER

## 2021-02-05 NOTE — PROGRESS NOTES
[facility] Pulmonary Follow up    CHIEF COMPLAINT    Congestion    HISTORY OF PRESENT ILLNESS    Kathy Asher is a 65 y.o.female here today for follow-up of her congestion.  She was last seen in the office by me in August.  She states that she has had several sinus infections since her last appointment.    She continues to use her albuterol rescue inhaler occasionally for shortness of breath.  She does have some shortness of breath with exertion but does recover fairly quickly.      She has occasional chest tightness but this does go away as quick as it comes.  She states that it does not happen that regularly.  She does not associate anything with the pain.    She has started allergy injections with her allergist since December.  She has not noticed an improvement in her symptoms.  She continues to take Singulair and Zyrtec-D regularly.  She also has 2 nasal spray she takes twice a day.  She continues to have postnasal drainage and rhinorrhea.    She denies fever, chills, sputum production, hemoptysis, night sweats, weight loss, chest pain or palpitations.  She denies any lower extremity edema or calf tenderness.  She denies reflux symptoms and does take occasional over-the-counter medication as needed.    She is up-to-date on her current vaccinations.    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)   • Abnormal finding on lung imaging   • Asthma   • IBS (irritable bowel syndrome)   • Murmur   • Raised level of immunoglobulins   • Sarcoidosis   • Allergic rhinitis   • ANNABELLA II (cervical intraepithelial neoplasia II)   • ELA III (vulvar intraepithelial neoplasia III)   • Open-angle glaucoma   • Regular astigmatism   • Polycythemia   • Morbid (severe) obesity due to excess calories (CMS/[facility])       Allergies   Allergen Reactions   • Ciprofloxacin Other (See Comments)     Mouth felt funny, very dry   • Etodolac Other (See Comments)      Slurred speech        Current Outpatient Medications:   •  albuterol (PROAIR HFA) 108 (90 Base) MCG/ACT inhaler, Inhale 2 puffs Every 4 (Four) Hours As Needed for Wheezing., Disp: 1 inhaler, Rfl: 11  •  Ascorbic Acid (VITAMIN C) 500 MG capsule, Take  by mouth., Disp: , Rfl:   •  azelastine (ASTELIN) 0.1 % nasal spray, U 2 SPRAYS IEN BID PRF ALLERGIES, Disp: , Rfl: 11  •  Calcium-Magnesium-Vitamin D - MG-MG-UNIT tablet sustained-release 24 hour, Take 1 tablet by mouth Daily., Disp: , Rfl:   •  cetirizine (zyrTEC) 10 MG tablet, Take 10 mg by mouth Daily., Disp: , Rfl:   •  cetirizine-pseudoephedrine (ZYRTEC-D ALLERGY & CONGESTION) 5-120 MG per 12 hr tablet, Take  by mouth., Disp: , Rfl:   •  fluticasone (FLONASE) 50 MCG/ACT nasal spray, into each nostril 2 (two) times a day., Disp: , Rfl:   •  latanoprost (XALATAN) 0.005 % ophthalmic solution, Apply  to eye., Disp: , Rfl:   •  montelukast (SINGULAIR) 10 MG tablet, TK 1 T PO IN THE DEREK, Disp: , Rfl: 11  •  Multiple Vitamin (MULTI-VITAMIN) tablet, Take  by mouth., Disp: , Rfl:   •  Vitamin E 400 UNITS tablet, Take  by mouth., Disp: , Rfl:   MEDICATION LIST AND ALLERGIES REVIEWED.    Social History     Tobacco Use   • Smoking status: Never Smoker   • Smokeless tobacco: Never Used   Substance Use Topics   • Alcohol use: No   • Drug use: No       FAMILY AND SOCIAL HISTORY REVIEWED.    Review of Systems   Constitutional: Negative for activity change, appetite change, fatigue, fever and unexpected weight change.   HENT: Positive for congestion. Negative for postnasal drip, rhinorrhea, sinus pressure, sore throat and voice change.    Eyes: Negative for visual disturbance.   Respiratory: Positive for chest tightness and shortness of breath. Negative for cough and wheezing.    Cardiovascular: Negative for chest pain, palpitations and leg swelling.   Gastrointestinal: Negative for abdominal distention, abdominal pain, nausea and vomiting.   Endocrine: Negative for  "cold intolerance and heat intolerance.   Genitourinary: Negative for difficulty urinating and urgency.   Musculoskeletal: Negative for arthralgias, back pain and neck pain.   Skin: Negative for color change and pallor.   Allergic/Immunologic: Negative for environmental allergies and food allergies.   Neurological: Negative for dizziness, syncope, weakness and light-headedness.   Hematological: Negative for adenopathy. Does not bruise/bleed easily.   Psychiatric/Behavioral: Negative for agitation and behavioral problems.   .    /80   Pulse 85   Temp 97.3 °F (36.3 °C)   Ht 160 cm (62.99\")   Wt 85.7 kg (189 lb)   SpO2 96% Comment: resting at room air  BMI 33.49 kg/m²     Immunization History   Administered Date(s) Administered   • Flu Vaccine Quad PF >36MO 02/13/2020   • Flulaval/Fluarix/Fluzone Quad 02/13/2020   • Td 08/01/2007   • Tdap 08/03/2017       Physical Exam  Vitals signs and nursing note reviewed.   Constitutional:       Appearance: She is well-developed. She is not diaphoretic.   HENT:      Head: Normocephalic and atraumatic.   Eyes:      Pupils: Pupils are equal, round, and reactive to light.   Neck:      Musculoskeletal: Normal range of motion and neck supple.      Thyroid: No thyromegaly.   Cardiovascular:      Rate and Rhythm: Normal rate and regular rhythm.      Heart sounds: Normal heart sounds. No murmur. No friction rub. No gallop.    Pulmonary:      Effort: Pulmonary effort is normal. No respiratory distress.      Breath sounds: Normal breath sounds. No wheezing or rales.   Chest:      Chest wall: No tenderness.   Abdominal:      General: Bowel sounds are normal.      Palpations: Abdomen is soft.      Tenderness: There is no abdominal tenderness.   Musculoskeletal: Normal range of motion.         General: No swelling.   Lymphadenopathy:      Cervical: No cervical adenopathy.   Skin:     General: Skin is warm and dry.      Capillary Refill: Capillary refill takes less than 2 seconds. "   Neurological:      Mental Status: She is alert and oriented to person, place, and time.   Psychiatric:         Mood and Affect: Mood normal.         Behavior: Behavior normal.           RESULTS    Chest PA/lateral: Official report pending    PROBLEM LIST    Problem List Items Addressed This Visit        Other    Asthma    Sarcoidosis    Overview     Diagnosed by lymph node biopsy in 2000.  She has well preserved spirometry.               Other Visit Diagnoses     SOB (shortness of breath)    -  Primary    Relevant Orders    XR Chest PA & Lateral            DISCUSSION    Ms. Trejo was here for follow-up of her congestion.  She states that she has noticed more congestion.  She does not appear to be infected in the office today.  We reviewed her chest x-ray and it does appear to have no acute pulmonary process however the fourth report is pending.  If there are any abnormalities I will notify the patient.    I would like to had her do PFTs today but she did not get Covid testing prior to her appointment.  I did offer to schedule these in the near future and she declined.    I did advise her to continue follow-up with her allergist for her allergic rhinitis.  I do suspect that some of her problems are related to her allergies.  Hopefully she will notice an improvement in her symptoms with regular allergy injections.    I did advise her that if the pain in her chest were to worsen or increase in duration she needs to present to the ED for further evaluation.    She does have some symptoms of GERD on occasion.  I did advise her that if she has symptoms daily she needs to start taking over-the-counter medication for that.  We did discuss reflux precautions in the office today such as elevating the head of the bed at night, not eating to 3 hours before bed and avoiding foods to trigger reflux symptoms.    She will follow-up in 6 months with Dr. Lam or sooner if her symptoms worsen.  She will need full PFTs at her  next appointment.    Level of service justified based on 32 minutes spent in patient care on this date of service including, but not limited to: preparing to see the patient, obtaining and/or reviewing history, performing medically appropriate examination, ordering tests/medicine/procedures, independently interpreting results, documenting clinical information in EHR, and counseling/education of patient/family/caregiver. (Level 4 30-39 minutes; Level 5 40-54 minutes)      Shagufta Estrada, APRN  02/05/202112:08 EST  Electronically signed     Please note that portions of this note were completed with a voice recognition program. Efforts were made to edit the dictations, but occasionally words are mistranscribed.      CC: Yennifer Galeana MD

## 2021-03-08 ENCOUNTER — OFFICE VISIT (OUTPATIENT)
Dept: GYNECOLOGIC ONCOLOGY | Facility: CLINIC | Age: 66
End: 2021-03-08

## 2021-03-08 VITALS
BODY MASS INDEX: 33.49 KG/M2 | WEIGHT: 189 LBS | HEART RATE: 86 BPM | DIASTOLIC BLOOD PRESSURE: 84 MMHG | TEMPERATURE: 97.7 F | RESPIRATION RATE: 12 BRPM | SYSTOLIC BLOOD PRESSURE: 131 MMHG

## 2021-03-08 DIAGNOSIS — D07.1 VIN III (VULVAR INTRAEPITHELIAL NEOPLASIA III): ICD-10-CM

## 2021-03-08 DIAGNOSIS — D07.2 VAIN III (VAGINAL INTRAEPITHELIAL NEOPLASIA GRADE III): ICD-10-CM

## 2021-03-08 DIAGNOSIS — N87.1 CIN II (CERVICAL INTRAEPITHELIAL NEOPLASIA II): Primary | ICD-10-CM

## 2021-03-08 PROCEDURE — 99213 OFFICE O/P EST LOW 20 MIN: CPT | Performed by: NURSE PRACTITIONER

## 2021-03-08 NOTE — PROGRESS NOTES
GYN ONCOLOGY FOLLOW-UP    Kathy Asher  [id number]  1955    Subjective   Chief Complaint: Follow-up (no gyn complaints)        History of present illness:     Kathy Asher is a 65 y.o. 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 She is s/p upper vaginectomy on 6/27/2017.  She also has a h/o ELA III and ANNABELLA II, see history below. Last pap smear on 2/19/2019 was negative, improved from previous LSIL paps.    Dysplasia history:  2007: LEEP with Dr. Gupta. Recurrent LSIL pap smears every 6 months after that time  8/27/2014: Biopsy of posterior vulvar lesion, path revealed ELA III. Referred to Dr. Harrell for second opinion  10/2/2014: Colpo with biopsies, revealed mild squamous dysplasia with HPV effect  12/10/2014: LAVH/BSO with vulvar WLE. Pathology consistent with ELA III, left margin focally positive. Cervix showed extensive endocervical moderate squamous epithelial dysplasia with HPV effect (ANNABELLA II).   4/18/2016: Pap smear of vaginal cuff showed LSIL with HPV non 16/18+  4/28/2016: Vaginoscopy with biopsy, pathology revealed severe dysplasia with HPV effect  6/27/2016: WLE of vaginal dysplasia, pathology consistent with ELA III, margins negative for dysplasia but positive for HPV effect  1/31/2017: Pap smear of vaginal cuff showed LSIL, stable considering history  1/29/2018: Pap of cuff shows persistent LSIL, stable  2/19/2019: Pap smear of vaginal cuff negative.   3/5/2020: Pap smear of vaginal cuff negative      The current medication list and allergy list were reviewed and reconciled.     Past Medical History, Past Surgical History, Social History, Family History have been reviewed and are without significant changes except as mentioned.      Review of Systems   Constitutional: Negative.    Gastrointestinal: Positive for constipation (with intermittent cramping, unchanged over last year).   Genitourinary: Negative.        Objective   Physical Exam  Vital  Signs: /84   Pulse 86   Temp 97.7 °F (36.5 °C)   Resp 12   Wt 85.7 kg (189 lb)   BMI 33.49 kg/m²   Vitals:    03/08/21 1328   PainSc: 0-No pain           General Appearance:  alert, cooperative, no apparent distress and appears stated age   Neurologic/Psychiatric: A&O x 3, gait steady, appropriate affect   HEENT:  Normocephalic, without obvious abnormality, mucous membranes moist   Breasts:  deferred   Abdomen:   Soft, non-tender, non-distended and no organomegaly   Lymph nodes: No inguinal adenopathy noted   Extremities: Normal, atraumatic; no clubbing, cyanosis, or edema    Pelvic: External Genitalia  without lesions or skin changes  Vagina  is pink, moist, without lesions.   Vaginal Cuff  Female Vaginal Cuff: smooth, intact, without visible lesions and pap obtained  Uterus  surgically absent and no palpable masses  Ovaries  surgically absent bilaterally  Parametria  smooth  Rectovaginal  Female rectovaginal: deferred       Procedure Notes:  No notes on file           Assessment and Plan:    Diagnoses and all orders for this visit:    1. ANNABELLA II (cervical intraepithelial neoplasia II) (Primary)    2. 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 ELA III (vulvar intraepithelial neoplasia III)          There is no evidence of disease or new lesions upon today's exam. She is understanding to call with any changes in pelvic symptoms or general GYN concerns at any time between regularly scheduled visits.     Pap was done today. If she does not receive the results of the Pap within 2 weeks  time, she was instructed to call to find out the results.      Patient and I again discussed a more regular home bowel regimen, including using Metamucil or similar fiber on days she is getting less in her diet, adding stool softener once or twice daily, and using Miralax PRN if no BM in 2-3 days. Keeping bowels more regular can help decrease cramping and discomfort. She v/u.     Pain assessment was performed  today as a part of patient’s care.  For patients with pain related to surgery, gynecologic malignancy or cancer treatment, the plan is as noted in the assessment/plan.  For patients with pain not related to these issues, they are to seek any further needed care from a more appropriate provider, such as PCP.      Follow-up:     Return to clinic in 1 year for repeat pap smear and ongoing CIS surveillance.      Electronically signed by SYLVESTER Avila on 03/08/21 at 13:52 EST

## 2021-04-13 ENCOUNTER — TRANSCRIBE ORDERS (OUTPATIENT)
Dept: LAB | Facility: HOSPITAL | Age: 66
End: 2021-04-13

## 2021-04-13 ENCOUNTER — LAB (OUTPATIENT)
Dept: LAB | Facility: HOSPITAL | Age: 66
End: 2021-04-13

## 2021-04-13 DIAGNOSIS — E04.1 NONTOXIC UNINODULAR GOITER: Primary | ICD-10-CM

## 2021-04-13 DIAGNOSIS — E04.1 NONTOXIC UNINODULAR GOITER: ICD-10-CM

## 2021-04-13 PROCEDURE — 86376 MICROSOMAL ANTIBODY EACH: CPT

## 2021-04-13 PROCEDURE — 36415 COLL VENOUS BLD VENIPUNCTURE: CPT

## 2021-04-13 PROCEDURE — 84480 ASSAY TRIIODOTHYRONINE (T3): CPT

## 2021-04-13 PROCEDURE — 84436 ASSAY OF TOTAL THYROXINE: CPT

## 2021-04-13 PROCEDURE — 86800 THYROGLOBULIN ANTIBODY: CPT

## 2021-04-13 PROCEDURE — 84443 ASSAY THYROID STIM HORMONE: CPT

## 2021-04-13 PROCEDURE — 82565 ASSAY OF CREATININE: CPT

## 2021-04-13 PROCEDURE — 86038 ANTINUCLEAR ANTIBODIES: CPT

## 2021-04-14 LAB
CREAT SERPL-MCNC: 0.93 MG/DL (ref 0.57–1)
GFR SERPL CREATININE-BSD FRML MDRD: 73 ML/MIN/1.73
T3 SERPL-MCNC: 105 NG/DL (ref 80–200)
T4 SERPL-MCNC: 6.72 MCG/DL (ref 4.5–11.7)
TSH SERPL DL<=0.05 MIU/L-ACNC: 1.45 UIU/ML (ref 0.27–4.2)

## 2021-04-15 LAB
ANA SER QL: NEGATIVE
THYROGLOB AB SERPL-ACNC: <1 IU/ML (ref 0–0.9)
THYROPEROXIDASE AB SERPL-ACNC: 11 IU/ML (ref 0–34)

## 2021-08-17 ENCOUNTER — CLINICAL SUPPORT NO REQUIREMENTS (OUTPATIENT)
Dept: PULMONOLOGY | Facility: CLINIC | Age: 66
End: 2021-08-17

## 2021-08-17 DIAGNOSIS — Z01.812 BLOOD TESTS PRIOR TO TREATMENT OR PROCEDURE: Primary | ICD-10-CM

## 2021-08-17 PROCEDURE — U0004 COV-19 TEST NON-CDC HGH THRU: HCPCS | Performed by: NURSE PRACTITIONER

## 2021-08-17 PROCEDURE — 99211 OFF/OP EST MAY X REQ PHY/QHP: CPT | Performed by: NURSE PRACTITIONER

## 2021-08-18 LAB — SARS-COV-2 RNA NOSE QL NAA+PROBE: NOT DETECTED

## 2022-03-09 ENCOUNTER — OFFICE VISIT (OUTPATIENT)
Dept: GYNECOLOGIC ONCOLOGY | Facility: CLINIC | Age: 67
End: 2022-03-09

## 2022-03-09 VITALS
HEART RATE: 76 BPM | BODY MASS INDEX: 34.09 KG/M2 | TEMPERATURE: 97.5 F | DIASTOLIC BLOOD PRESSURE: 75 MMHG | RESPIRATION RATE: 15 BRPM | SYSTOLIC BLOOD PRESSURE: 132 MMHG | WEIGHT: 192.4 LBS | HEIGHT: 63 IN

## 2022-03-09 DIAGNOSIS — Z87.412 HISTORY OF VULVAR DYSPLASIA: ICD-10-CM

## 2022-03-09 DIAGNOSIS — Z87.410 HISTORY OF CERVICAL DYSPLASIA: ICD-10-CM

## 2022-03-09 DIAGNOSIS — D07.2 VAIN III (VAGINAL INTRAEPITHELIAL NEOPLASIA GRADE III): Primary | ICD-10-CM

## 2022-03-09 PROCEDURE — 88142 CYTOPATH C/V THIN LAYER: CPT | Performed by: NURSE PRACTITIONER

## 2022-03-09 PROCEDURE — 99212 OFFICE O/P EST SF 10 MIN: CPT | Performed by: NURSE PRACTITIONER

## 2022-03-09 PROCEDURE — 3015F CERV CANCER SCREEN DOCD: CPT | Performed by: NURSE PRACTITIONER

## 2022-03-09 RX ORDER — LEVOCETIRIZINE DIHYDROCHLORIDE 5 MG/1
5 TABLET, FILM COATED ORAL DAILY
COMMUNITY
Start: 2022-02-10

## 2022-03-09 NOTE — PROGRESS NOTES
GYN ONCOLOGY FOLLOW-UP    Kathy Asher  [id number]  1955    Subjective   Chief Complaint: Gynecologic Exam (Repeat pap smear, no complaints)        History of present illness:     Kathy Asher is a 66 y.o. 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 She is s/p upper vaginectomy on 6/27/2016.  She also has a h/o ELA III and ANNABELLA II, see history below. Last pap smear on 3/8/2021 was negative, improved from previous LSIL paps.    Dysplasia history:  2007: LEEP with Dr. Gupta. Recurrent LSIL pap smears every 6 months after that time  8/27/2014: Biopsy of posterior vulvar lesion, path revealed ELA III. Referred to Dr. Harrell for second opinion  10/2/2014: Colpo with biopsies, revealed mild squamous dysplasia with HPV effect  12/10/2014: LAVH/BSO with vulvar WLE. Pathology consistent with ELA III, left margin focally positive. Cervix showed extensive endocervical moderate squamous epithelial dysplasia with HPV effect (ANNABELLA II).   4/18/2016: Pap smear of vaginal cuff showed LSIL with HPV non 16/18+  4/28/2016: Vaginoscopy with biopsy, pathology revealed severe dysplasia with HPV effect  6/27/2016: WLE of vaginal dysplasia, pathology consistent with ELA III, margins negative for dysplasia but positive for HPV effect  1/31/2017: Pap smear of vaginal cuff showed LSIL, stable considering history  1/29/2018: Pap of cuff shows persistent LSIL, stable  2/19/2019: Pap smear of vaginal cuff negative.   3/5/2020: Pap smear of vaginal cuff negative  3/8/2021: Pap smear of vaginal cuff negative      The current medication list and allergy list were reviewed and reconciled.     Past Medical History, Past Surgical History, Social History, Family History have been reviewed and are without significant changes except as mentioned.      Review of Systems   Constitutional: Negative.    Gastrointestinal: Positive for constipation (unchanged ).   Genitourinary: Negative.        Objective  "  Physical Exam  Vital Signs: /75   Pulse 76   Temp 97.5 °F (36.4 °C) (Temporal)   Resp 15   Ht 160 cm (63\")   Wt 87.3 kg (192 lb 6.4 oz)   BMI 34.08 kg/m²   Vitals:    03/09/22 1346   PainSc: 0-No pain           General Appearance:  alert, cooperative, no apparent distress, appears stated age and obese by BMI criteria   Neurologic/Psychiatric: A&O x 3, gait steady, appropriate affect   HEENT:  Normocephalic, without obvious abnormality, mucous membranes moist   Breasts:  deferred   Abdomen:   Soft, non-tender, non-distended and no organomegaly   Lymph nodes: No inguinal adenopathy noted   Extremities: Normal, atraumatic; no clubbing, cyanosis, or edema    Pelvic: External Genitalia  without lesions or skin changes  Vagina  is pink, moist, without lesions.   Vaginal Cuff  Female Vaginal Cuff: smooth, intact, without visible lesions and pap obtained  Uterus  surgically absent and no palpable masses  Ovaries  surgically absent bilaterally  Parametria  smooth  Rectovaginal  Female rectovaginal: deferred       Procedure Notes:              Assessment and Plan:    Diagnoses and all orders for this visit:    1. VAIN III (vaginal intraepithelial neoplasia grade III) (Primary)  -     Liquid-based Pap Smear, Diagnostic    2. History of cervical dysplasia    3. History of vulvar dysplasia          There is no evidence of disease or new lesions upon today's exam. She is understanding to call with any changes in pelvic symptoms or general GYN concerns at any time between regularly scheduled visits.     Pap was done today. If she does not receive the results of the Pap within 2 weeks  time, she was instructed to call to find out the results.      Pain assessment was performed today as a part of patient’s care.  For patients with pain related to surgery, gynecologic malignancy or cancer treatment, the plan is as noted in the assessment/plan.  For patients with pain not related to these issues, they are to seek any " further needed care from a more appropriate provider, such as PCP.      Follow-up:     Return to clinic in 1 year for repeat pap smear and ongoing CIS surveillance.      Electronically signed by SYLVESTER Avila on 03/09/22 at 15:35 EST
